# Patient Record
Sex: MALE | Race: WHITE | NOT HISPANIC OR LATINO | Employment: FULL TIME
[De-identification: names, ages, dates, MRNs, and addresses within clinical notes are randomized per-mention and may not be internally consistent; named-entity substitution may affect disease eponyms.]

---

## 2019-09-30 ENCOUNTER — HEALTH MAINTENANCE LETTER (OUTPATIENT)
Age: 59
End: 2019-09-30

## 2021-01-15 ENCOUNTER — HEALTH MAINTENANCE LETTER (OUTPATIENT)
Age: 61
End: 2021-01-15

## 2021-10-24 ENCOUNTER — HEALTH MAINTENANCE LETTER (OUTPATIENT)
Age: 61
End: 2021-10-24

## 2022-02-13 ENCOUNTER — HEALTH MAINTENANCE LETTER (OUTPATIENT)
Age: 62
End: 2022-02-13

## 2022-10-15 ENCOUNTER — HEALTH MAINTENANCE LETTER (OUTPATIENT)
Age: 62
End: 2022-10-15

## 2023-03-26 ENCOUNTER — HEALTH MAINTENANCE LETTER (OUTPATIENT)
Age: 63
End: 2023-03-26

## 2024-02-11 ENCOUNTER — ANESTHESIA (OUTPATIENT)
Dept: SURGERY | Facility: CLINIC | Age: 64
DRG: 481 | End: 2024-02-11
Payer: COMMERCIAL

## 2024-02-11 ENCOUNTER — APPOINTMENT (OUTPATIENT)
Dept: RADIOLOGY | Facility: CLINIC | Age: 64
DRG: 481 | End: 2024-02-11
Attending: EMERGENCY MEDICINE
Payer: COMMERCIAL

## 2024-02-11 ENCOUNTER — APPOINTMENT (OUTPATIENT)
Dept: RADIOLOGY | Facility: CLINIC | Age: 64
DRG: 481 | End: 2024-02-11
Attending: STUDENT IN AN ORGANIZED HEALTH CARE EDUCATION/TRAINING PROGRAM
Payer: COMMERCIAL

## 2024-02-11 ENCOUNTER — PREP FOR PROCEDURE (OUTPATIENT)
Dept: SURGERY | Facility: CLINIC | Age: 64
End: 2024-02-11

## 2024-02-11 ENCOUNTER — APPOINTMENT (OUTPATIENT)
Dept: CT IMAGING | Facility: CLINIC | Age: 64
DRG: 481 | End: 2024-02-11
Attending: PHYSICIAN ASSISTANT
Payer: COMMERCIAL

## 2024-02-11 ENCOUNTER — ANESTHESIA EVENT (OUTPATIENT)
Dept: SURGERY | Facility: CLINIC | Age: 64
DRG: 481 | End: 2024-02-11
Payer: COMMERCIAL

## 2024-02-11 ENCOUNTER — HOSPITAL ENCOUNTER (INPATIENT)
Facility: CLINIC | Age: 64
LOS: 1 days | Discharge: HOME HEALTH | DRG: 481 | End: 2024-02-12
Attending: EMERGENCY MEDICINE | Admitting: STUDENT IN AN ORGANIZED HEALTH CARE EDUCATION/TRAINING PROGRAM
Payer: COMMERCIAL

## 2024-02-11 ENCOUNTER — APPOINTMENT (OUTPATIENT)
Dept: CT IMAGING | Facility: CLINIC | Age: 64
DRG: 481 | End: 2024-02-11
Attending: STUDENT IN AN ORGANIZED HEALTH CARE EDUCATION/TRAINING PROGRAM
Payer: COMMERCIAL

## 2024-02-11 DIAGNOSIS — S72.001A CLOSED FRACTURE OF RIGHT HIP, INITIAL ENCOUNTER (H): ICD-10-CM

## 2024-02-11 DIAGNOSIS — E83.42 HYPOMAGNESEMIA: Primary | ICD-10-CM

## 2024-02-11 DIAGNOSIS — S72.001A CLOSED FRACTURE OF RIGHT HIP, INITIAL ENCOUNTER (H): Primary | ICD-10-CM

## 2024-02-11 LAB
ANION GAP SERPL CALCULATED.3IONS-SCNC: 8 MMOL/L (ref 7–15)
ATRIAL RATE - MUSE: 57 BPM
BASOPHILS # BLD AUTO: ABNORMAL 10*3/UL
BASOPHILS # BLD MANUAL: 0 10E3/UL (ref 0–0.2)
BASOPHILS NFR BLD AUTO: ABNORMAL %
BASOPHILS NFR BLD MANUAL: 0 %
BUN SERPL-MCNC: 16.6 MG/DL (ref 8–23)
CALCIUM SERPL-MCNC: 9.4 MG/DL (ref 8.8–10.2)
CHLORIDE SERPL-SCNC: 104 MMOL/L (ref 98–107)
CREAT SERPL-MCNC: 1 MG/DL (ref 0.67–1.17)
DEPRECATED HCO3 PLAS-SCNC: 25 MMOL/L (ref 22–29)
DIASTOLIC BLOOD PRESSURE - MUSE: 71 MMHG
EGFRCR SERPLBLD CKD-EPI 2021: 84 ML/MIN/1.73M2
EOSINOPHIL # BLD AUTO: ABNORMAL 10*3/UL
EOSINOPHIL # BLD MANUAL: 0.1 10E3/UL (ref 0–0.7)
EOSINOPHIL NFR BLD AUTO: ABNORMAL %
EOSINOPHIL NFR BLD MANUAL: 1 %
ERYTHROCYTE [DISTWIDTH] IN BLOOD BY AUTOMATED COUNT: 13.7 % (ref 10–15)
GLUCOSE BLDC GLUCOMTR-MCNC: 101 MG/DL (ref 70–99)
GLUCOSE SERPL-MCNC: 112 MG/DL (ref 70–99)
HCT VFR BLD AUTO: 43.2 % (ref 40–53)
HGB BLD-MCNC: 14.7 G/DL (ref 13.3–17.7)
IMM GRANULOCYTES # BLD: ABNORMAL 10*3/UL
IMM GRANULOCYTES NFR BLD: ABNORMAL %
INR PPP: 1.04 (ref 0.85–1.15)
INTERPRETATION ECG - MUSE: NORMAL
LYMPHOCYTES # BLD AUTO: ABNORMAL 10*3/UL
LYMPHOCYTES # BLD MANUAL: 1.5 10E3/UL (ref 0.8–5.3)
LYMPHOCYTES NFR BLD AUTO: ABNORMAL %
LYMPHOCYTES NFR BLD MANUAL: 12 %
MCH RBC QN AUTO: 31.5 PG (ref 26.5–33)
MCHC RBC AUTO-ENTMCNC: 34 G/DL (ref 31.5–36.5)
MCV RBC AUTO: 93 FL (ref 78–100)
MONOCYTES # BLD AUTO: ABNORMAL 10*3/UL
MONOCYTES # BLD MANUAL: 0.4 10E3/UL (ref 0–1.3)
MONOCYTES NFR BLD AUTO: ABNORMAL %
MONOCYTES NFR BLD MANUAL: 3 %
NEUTROPHILS # BLD AUTO: ABNORMAL 10*3/UL
NEUTROPHILS # BLD MANUAL: 10.8 10E3/UL (ref 1.6–8.3)
NEUTROPHILS NFR BLD AUTO: ABNORMAL %
NEUTROPHILS NFR BLD MANUAL: 84 %
NRBC # BLD AUTO: 0 10E3/UL
NRBC BLD AUTO-RTO: 0 /100
P AXIS - MUSE: 69 DEGREES
PLAT MORPH BLD: ABNORMAL
PLATELET # BLD AUTO: 160 10E3/UL (ref 150–450)
POTASSIUM SERPL-SCNC: 4.8 MMOL/L (ref 3.4–5.3)
PR INTERVAL - MUSE: 160 MS
QRS DURATION - MUSE: 110 MS
QT - MUSE: 450 MS
QTC - MUSE: 438 MS
R AXIS - MUSE: 67 DEGREES
RBC # BLD AUTO: 4.66 10E6/UL (ref 4.4–5.9)
RBC MORPH BLD: ABNORMAL
SODIUM SERPL-SCNC: 137 MMOL/L (ref 135–145)
SYSTOLIC BLOOD PRESSURE - MUSE: 131 MMHG
T AXIS - MUSE: 56 DEGREES
VENTRICULAR RATE- MUSE: 57 BPM
WBC # BLD AUTO: 12.8 10E3/UL (ref 4–11)

## 2024-02-11 PROCEDURE — C1713 ANCHOR/SCREW BN/BN,TIS/BN: HCPCS | Performed by: STUDENT IN AN ORGANIZED HEALTH CARE EDUCATION/TRAINING PROGRAM

## 2024-02-11 PROCEDURE — 250N000011 HC RX IP 250 OP 636: Performed by: EMERGENCY MEDICINE

## 2024-02-11 PROCEDURE — 370N000017 HC ANESTHESIA TECHNICAL FEE, PER MIN: Performed by: STUDENT IN AN ORGANIZED HEALTH CARE EDUCATION/TRAINING PROGRAM

## 2024-02-11 PROCEDURE — 710N000010 HC RECOVERY PHASE 1, LEVEL 2, PER MIN: Performed by: STUDENT IN AN ORGANIZED HEALTH CARE EDUCATION/TRAINING PROGRAM

## 2024-02-11 PROCEDURE — 85007 BL SMEAR W/DIFF WBC COUNT: CPT | Performed by: EMERGENCY MEDICINE

## 2024-02-11 PROCEDURE — 96376 TX/PRO/DX INJ SAME DRUG ADON: CPT

## 2024-02-11 PROCEDURE — 272N000001 HC OR GENERAL SUPPLY STERILE: Performed by: STUDENT IN AN ORGANIZED HEALTH CARE EDUCATION/TRAINING PROGRAM

## 2024-02-11 PROCEDURE — 120N000001 HC R&B MED SURG/OB

## 2024-02-11 PROCEDURE — 82962 GLUCOSE BLOOD TEST: CPT

## 2024-02-11 PROCEDURE — 70450 CT HEAD/BRAIN W/O DYE: CPT

## 2024-02-11 PROCEDURE — 258N000003 HC RX IP 258 OP 636: Performed by: NURSE ANESTHETIST, CERTIFIED REGISTERED

## 2024-02-11 PROCEDURE — 73552 X-RAY EXAM OF FEMUR 2/>: CPT | Mod: RT

## 2024-02-11 PROCEDURE — 250N000011 HC RX IP 250 OP 636: Performed by: NURSE ANESTHETIST, CERTIFIED REGISTERED

## 2024-02-11 PROCEDURE — 250N000011 HC RX IP 250 OP 636: Performed by: STUDENT IN AN ORGANIZED HEALTH CARE EDUCATION/TRAINING PROGRAM

## 2024-02-11 PROCEDURE — 258N000003 HC RX IP 258 OP 636: Performed by: STUDENT IN AN ORGANIZED HEALTH CARE EDUCATION/TRAINING PROGRAM

## 2024-02-11 PROCEDURE — 250N000013 HC RX MED GY IP 250 OP 250 PS 637: Performed by: STUDENT IN AN ORGANIZED HEALTH CARE EDUCATION/TRAINING PROGRAM

## 2024-02-11 PROCEDURE — 73700 CT LOWER EXTREMITY W/O DYE: CPT | Mod: RT

## 2024-02-11 PROCEDURE — 0QS636Z REPOSITION RIGHT UPPER FEMUR WITH INTRAMEDULLARY INTERNAL FIXATION DEVICE, PERCUTANEOUS APPROACH: ICD-10-PCS | Performed by: STUDENT IN AN ORGANIZED HEALTH CARE EDUCATION/TRAINING PROGRAM

## 2024-02-11 PROCEDURE — 96374 THER/PROPH/DIAG INJ IV PUSH: CPT | Mod: 59

## 2024-02-11 PROCEDURE — 99285 EMERGENCY DEPT VISIT HI MDM: CPT | Mod: 25

## 2024-02-11 PROCEDURE — 250N000011 HC RX IP 250 OP 636: Performed by: ANESTHESIOLOGY

## 2024-02-11 PROCEDURE — 80048 BASIC METABOLIC PNL TOTAL CA: CPT | Performed by: EMERGENCY MEDICINE

## 2024-02-11 PROCEDURE — 250N000009 HC RX 250: Performed by: NURSE ANESTHETIST, CERTIFIED REGISTERED

## 2024-02-11 PROCEDURE — 999N000182 XR SURGERY CARM FLUORO GREATER THAN 5 MIN: Mod: TC

## 2024-02-11 PROCEDURE — 85610 PROTHROMBIN TIME: CPT | Performed by: EMERGENCY MEDICINE

## 2024-02-11 PROCEDURE — 72170 X-RAY EXAM OF PELVIS: CPT

## 2024-02-11 PROCEDURE — 85027 COMPLETE CBC AUTOMATED: CPT | Performed by: EMERGENCY MEDICINE

## 2024-02-11 PROCEDURE — 73560 X-RAY EXAM OF KNEE 1 OR 2: CPT | Mod: RT

## 2024-02-11 PROCEDURE — C1769 GUIDE WIRE: HCPCS | Performed by: STUDENT IN AN ORGANIZED HEALTH CARE EDUCATION/TRAINING PROGRAM

## 2024-02-11 PROCEDURE — 36415 COLL VENOUS BLD VENIPUNCTURE: CPT | Performed by: EMERGENCY MEDICINE

## 2024-02-11 PROCEDURE — 99222 1ST HOSP IP/OBS MODERATE 55: CPT | Performed by: STUDENT IN AN ORGANIZED HEALTH CARE EDUCATION/TRAINING PROGRAM

## 2024-02-11 PROCEDURE — 360N000084 HC SURGERY LEVEL 4 W/ FLUORO, PER MIN: Performed by: STUDENT IN AN ORGANIZED HEALTH CARE EDUCATION/TRAINING PROGRAM

## 2024-02-11 PROCEDURE — 93005 ELECTROCARDIOGRAM TRACING: CPT | Performed by: EMERGENCY MEDICINE

## 2024-02-11 PROCEDURE — 71045 X-RAY EXAM CHEST 1 VIEW: CPT

## 2024-02-11 PROCEDURE — 250N000009 HC RX 250: Performed by: STUDENT IN AN ORGANIZED HEALTH CARE EDUCATION/TRAINING PROGRAM

## 2024-02-11 DEVICE — IMPLANTABLE DEVICE: Type: IMPLANTABLE DEVICE | Site: HIP | Status: FUNCTIONAL

## 2024-02-11 DEVICE — IMP SCR SYN TFNA FENESTRATED LAG 110MM 04.038.210S: Type: IMPLANTABLE DEVICE | Site: HIP | Status: FUNCTIONAL

## 2024-02-11 DEVICE — SCREW BN 38MM 5MM LCK X25 IM NL 04.045.038S: Type: IMPLANTABLE DEVICE | Site: HIP | Status: FUNCTIONAL

## 2024-02-11 RX ORDER — BUPIVACAINE HYDROCHLORIDE 5 MG/ML
INJECTION, SOLUTION EPIDURAL; INTRACAUDAL
Status: COMPLETED | OUTPATIENT
Start: 2024-02-11 | End: 2024-02-11

## 2024-02-11 RX ORDER — OXYCODONE HYDROCHLORIDE 5 MG/1
10 TABLET ORAL EVERY 4 HOURS PRN
Status: DISCONTINUED | OUTPATIENT
Start: 2024-02-11 | End: 2024-02-12 | Stop reason: HOSPADM

## 2024-02-11 RX ORDER — ONDANSETRON 2 MG/ML
INJECTION INTRAMUSCULAR; INTRAVENOUS PRN
Status: DISCONTINUED | OUTPATIENT
Start: 2024-02-11 | End: 2024-02-11

## 2024-02-11 RX ORDER — POLYETHYLENE GLYCOL 3350 17 G/17G
17 POWDER, FOR SOLUTION ORAL DAILY
Status: DISCONTINUED | OUTPATIENT
Start: 2024-02-12 | End: 2024-02-12 | Stop reason: HOSPADM

## 2024-02-11 RX ORDER — VANCOMYCIN HYDROCHLORIDE 1 G/20ML
INJECTION, POWDER, LYOPHILIZED, FOR SOLUTION INTRAVENOUS PRN
Status: DISCONTINUED | OUTPATIENT
Start: 2024-02-11 | End: 2024-02-11 | Stop reason: HOSPADM

## 2024-02-11 RX ORDER — CEFAZOLIN SODIUM 2 G/100ML
2 INJECTION, SOLUTION INTRAVENOUS EVERY 8 HOURS
Qty: 200 ML | Refills: 0 | Status: COMPLETED | OUTPATIENT
Start: 2024-02-11 | End: 2024-02-12

## 2024-02-11 RX ORDER — KETAMINE HYDROCHLORIDE 10 MG/ML
INJECTION INTRAMUSCULAR; INTRAVENOUS PRN
Status: DISCONTINUED | OUTPATIENT
Start: 2024-02-11 | End: 2024-02-11

## 2024-02-11 RX ORDER — CALCIUM CARBONATE 500(1250)
1 TABLET ORAL DAILY
COMMUNITY

## 2024-02-11 RX ORDER — ASPIRIN 81 MG/1
81 TABLET ORAL 2 TIMES DAILY
Status: DISCONTINUED | OUTPATIENT
Start: 2024-02-11 | End: 2024-02-12 | Stop reason: HOSPADM

## 2024-02-11 RX ORDER — NALOXONE HYDROCHLORIDE 0.4 MG/ML
0.4 INJECTION, SOLUTION INTRAMUSCULAR; INTRAVENOUS; SUBCUTANEOUS
Status: DISCONTINUED | OUTPATIENT
Start: 2024-02-11 | End: 2024-02-12 | Stop reason: HOSPADM

## 2024-02-11 RX ORDER — DEXAMETHASONE SODIUM PHOSPHATE 10 MG/ML
INJECTION, SOLUTION INTRAMUSCULAR; INTRAVENOUS PRN
Status: DISCONTINUED | OUTPATIENT
Start: 2024-02-11 | End: 2024-02-11

## 2024-02-11 RX ORDER — PSEUDOEPHED/ACETAMINOPH/DIPHEN 30MG-500MG
500-1000 TABLET ORAL EVERY 4 HOURS PRN
Status: ON HOLD | COMMUNITY
Start: 2023-02-16 | End: 2024-02-12

## 2024-02-11 RX ORDER — LIDOCAINE HYDROCHLORIDE 10 MG/ML
INJECTION, SOLUTION INFILTRATION; PERINEURAL PRN
Status: DISCONTINUED | OUTPATIENT
Start: 2024-02-11 | End: 2024-02-11

## 2024-02-11 RX ORDER — FENTANYL CITRATE 50 UG/ML
50 INJECTION, SOLUTION INTRAMUSCULAR; INTRAVENOUS EVERY 5 MIN PRN
Status: DISCONTINUED | OUTPATIENT
Start: 2024-02-11 | End: 2024-02-11 | Stop reason: HOSPADM

## 2024-02-11 RX ORDER — ONDANSETRON 2 MG/ML
4 INJECTION INTRAMUSCULAR; INTRAVENOUS EVERY 30 MIN PRN
Status: DISCONTINUED | OUTPATIENT
Start: 2024-02-11 | End: 2024-02-11 | Stop reason: HOSPADM

## 2024-02-11 RX ORDER — AMOXICILLIN 250 MG
1 CAPSULE ORAL 2 TIMES DAILY
Status: DISCONTINUED | OUTPATIENT
Start: 2024-02-11 | End: 2024-02-12 | Stop reason: HOSPADM

## 2024-02-11 RX ORDER — ROSUVASTATIN CALCIUM 40 MG/1
40 TABLET, COATED ORAL DAILY
COMMUNITY
Start: 2023-12-26 | End: 2024-12-25

## 2024-02-11 RX ORDER — HYDROMORPHONE HCL IN WATER/PF 6 MG/30 ML
0.4 PATIENT CONTROLLED ANALGESIA SYRINGE INTRAVENOUS EVERY 5 MIN PRN
Status: DISCONTINUED | OUTPATIENT
Start: 2024-02-11 | End: 2024-02-11 | Stop reason: HOSPADM

## 2024-02-11 RX ORDER — BISACODYL 10 MG
10 SUPPOSITORY, RECTAL RECTAL DAILY PRN
Status: DISCONTINUED | OUTPATIENT
Start: 2024-02-11 | End: 2024-02-12 | Stop reason: HOSPADM

## 2024-02-11 RX ORDER — PROCHLORPERAZINE MALEATE 10 MG
10 TABLET ORAL EVERY 6 HOURS PRN
Status: DISCONTINUED | OUTPATIENT
Start: 2024-02-11 | End: 2024-02-12 | Stop reason: HOSPADM

## 2024-02-11 RX ORDER — NALOXONE HYDROCHLORIDE 0.4 MG/ML
0.2 INJECTION, SOLUTION INTRAMUSCULAR; INTRAVENOUS; SUBCUTANEOUS
Status: DISCONTINUED | OUTPATIENT
Start: 2024-02-11 | End: 2024-02-12 | Stop reason: HOSPADM

## 2024-02-11 RX ORDER — LIDOCAINE 40 MG/G
CREAM TOPICAL
Status: DISCONTINUED | OUTPATIENT
Start: 2024-02-11 | End: 2024-02-12

## 2024-02-11 RX ORDER — POLYETHYLENE GLYCOL 3350 17 G/17G
17 POWDER, FOR SOLUTION ORAL 2 TIMES DAILY PRN
Status: DISCONTINUED | OUTPATIENT
Start: 2024-02-11 | End: 2024-02-12 | Stop reason: HOSPADM

## 2024-02-11 RX ORDER — AMOXICILLIN 250 MG
2 CAPSULE ORAL 2 TIMES DAILY PRN
Status: DISCONTINUED | OUTPATIENT
Start: 2024-02-11 | End: 2024-02-12

## 2024-02-11 RX ORDER — BUPIVACAINE HYDROCHLORIDE 2.5 MG/ML
INJECTION, SOLUTION EPIDURAL; INFILTRATION; INTRACAUDAL
Status: COMPLETED | OUTPATIENT
Start: 2024-02-11 | End: 2024-02-11

## 2024-02-11 RX ORDER — HYDROMORPHONE HCL IN WATER/PF 6 MG/30 ML
0.4 PATIENT CONTROLLED ANALGESIA SYRINGE INTRAVENOUS
Status: DISCONTINUED | OUTPATIENT
Start: 2024-02-11 | End: 2024-02-12 | Stop reason: HOSPADM

## 2024-02-11 RX ORDER — VANCOMYCIN HYDROCHLORIDE 1 G/20ML
INJECTION, POWDER, LYOPHILIZED, FOR SOLUTION INTRAVENOUS
Status: DISCONTINUED
Start: 2024-02-11 | End: 2024-02-11 | Stop reason: HOSPADM

## 2024-02-11 RX ORDER — ASPIRIN 81 MG/1
81 TABLET ORAL DAILY
Status: ON HOLD | COMMUNITY
End: 2024-02-12

## 2024-02-11 RX ORDER — CEFAZOLIN SODIUM/WATER 2 G/20 ML
2 SYRINGE (ML) INTRAVENOUS SEE ADMIN INSTRUCTIONS
Status: DISCONTINUED | OUTPATIENT
Start: 2024-02-11 | End: 2024-02-11 | Stop reason: HOSPADM

## 2024-02-11 RX ORDER — ONDANSETRON 4 MG/1
4 TABLET, ORALLY DISINTEGRATING ORAL EVERY 6 HOURS PRN
Status: DISCONTINUED | OUTPATIENT
Start: 2024-02-11 | End: 2024-02-11

## 2024-02-11 RX ORDER — CEFAZOLIN SODIUM/WATER 2 G/20 ML
2 SYRINGE (ML) INTRAVENOUS
Status: COMPLETED | OUTPATIENT
Start: 2024-02-11 | End: 2024-02-11

## 2024-02-11 RX ORDER — ONDANSETRON 2 MG/ML
4 INJECTION INTRAMUSCULAR; INTRAVENOUS EVERY 6 HOURS PRN
Status: DISCONTINUED | OUTPATIENT
Start: 2024-02-11 | End: 2024-02-11

## 2024-02-11 RX ORDER — SODIUM CHLORIDE, SODIUM LACTATE, POTASSIUM CHLORIDE, CALCIUM CHLORIDE 600; 310; 30; 20 MG/100ML; MG/100ML; MG/100ML; MG/100ML
INJECTION, SOLUTION INTRAVENOUS CONTINUOUS
Status: DISCONTINUED | OUTPATIENT
Start: 2024-02-11 | End: 2024-02-11 | Stop reason: HOSPADM

## 2024-02-11 RX ORDER — ACETAMINOPHEN 325 MG/1
975 TABLET ORAL EVERY 8 HOURS
Status: DISCONTINUED | OUTPATIENT
Start: 2024-02-11 | End: 2024-02-12 | Stop reason: HOSPADM

## 2024-02-11 RX ORDER — ROSUVASTATIN CALCIUM 10 MG/1
40 TABLET, COATED ORAL DAILY
Status: DISCONTINUED | OUTPATIENT
Start: 2024-02-11 | End: 2024-02-12 | Stop reason: HOSPADM

## 2024-02-11 RX ORDER — SODIUM CHLORIDE, SODIUM LACTATE, POTASSIUM CHLORIDE, CALCIUM CHLORIDE 600; 310; 30; 20 MG/100ML; MG/100ML; MG/100ML; MG/100ML
INJECTION, SOLUTION INTRAVENOUS CONTINUOUS
Status: DISCONTINUED | OUTPATIENT
Start: 2024-02-11 | End: 2024-02-12 | Stop reason: HOSPADM

## 2024-02-11 RX ORDER — TRANEXAMIC ACID 650 MG/1
1950 TABLET ORAL ONCE
Status: CANCELLED | OUTPATIENT
Start: 2024-02-11 | End: 2024-02-11

## 2024-02-11 RX ORDER — ONDANSETRON 4 MG/1
4 TABLET, ORALLY DISINTEGRATING ORAL EVERY 6 HOURS PRN
Status: DISCONTINUED | OUTPATIENT
Start: 2024-02-11 | End: 2024-02-12 | Stop reason: HOSPADM

## 2024-02-11 RX ORDER — ACETAMINOPHEN 325 MG/1
650 TABLET ORAL EVERY 4 HOURS PRN
Status: DISCONTINUED | OUTPATIENT
Start: 2024-02-14 | End: 2024-02-12 | Stop reason: HOSPADM

## 2024-02-11 RX ORDER — HYDROMORPHONE HYDROCHLORIDE 1 MG/ML
0.5 INJECTION, SOLUTION INTRAMUSCULAR; INTRAVENOUS; SUBCUTANEOUS ONCE
Status: COMPLETED | OUTPATIENT
Start: 2024-02-11 | End: 2024-02-11

## 2024-02-11 RX ORDER — LIDOCAINE 40 MG/G
CREAM TOPICAL
Status: DISCONTINUED | OUTPATIENT
Start: 2024-02-11 | End: 2024-02-12 | Stop reason: HOSPADM

## 2024-02-11 RX ORDER — FENTANYL CITRATE 0.05 MG/ML
INJECTION, SOLUTION INTRAMUSCULAR; INTRAVENOUS PRN
Status: DISCONTINUED | OUTPATIENT
Start: 2024-02-11 | End: 2024-02-11

## 2024-02-11 RX ORDER — MULTIVITAMIN
1 TABLET ORAL DAILY
COMMUNITY

## 2024-02-11 RX ORDER — ONDANSETRON 2 MG/ML
4 INJECTION INTRAMUSCULAR; INTRAVENOUS EVERY 6 HOURS PRN
Status: DISCONTINUED | OUTPATIENT
Start: 2024-02-11 | End: 2024-02-12 | Stop reason: HOSPADM

## 2024-02-11 RX ORDER — MAGNESIUM HYDROXIDE 1200 MG/15ML
LIQUID ORAL PRN
Status: DISCONTINUED | OUTPATIENT
Start: 2024-02-11 | End: 2024-02-11 | Stop reason: HOSPADM

## 2024-02-11 RX ORDER — HYDROMORPHONE HCL IN WATER/PF 6 MG/30 ML
0.2 PATIENT CONTROLLED ANALGESIA SYRINGE INTRAVENOUS EVERY 5 MIN PRN
Status: DISCONTINUED | OUTPATIENT
Start: 2024-02-11 | End: 2024-02-11 | Stop reason: HOSPADM

## 2024-02-11 RX ORDER — OXYCODONE HYDROCHLORIDE 5 MG/1
5 TABLET ORAL EVERY 4 HOURS PRN
Status: DISCONTINUED | OUTPATIENT
Start: 2024-02-11 | End: 2024-02-12 | Stop reason: HOSPADM

## 2024-02-11 RX ORDER — LIDOCAINE 40 MG/G
CREAM TOPICAL
Status: DISCONTINUED | OUTPATIENT
Start: 2024-02-11 | End: 2024-02-11 | Stop reason: HOSPADM

## 2024-02-11 RX ORDER — SODIUM CHLORIDE, SODIUM LACTATE, POTASSIUM CHLORIDE, CALCIUM CHLORIDE 600; 310; 30; 20 MG/100ML; MG/100ML; MG/100ML; MG/100ML
INJECTION, SOLUTION INTRAVENOUS CONTINUOUS PRN
Status: DISCONTINUED | OUTPATIENT
Start: 2024-02-11 | End: 2024-02-11

## 2024-02-11 RX ORDER — MULTIVIT WITH MINERALS/LUTEIN
250 TABLET ORAL DAILY
COMMUNITY

## 2024-02-11 RX ORDER — HYDROMORPHONE HCL IN WATER/PF 6 MG/30 ML
0.2 PATIENT CONTROLLED ANALGESIA SYRINGE INTRAVENOUS
Status: DISCONTINUED | OUTPATIENT
Start: 2024-02-11 | End: 2024-02-12 | Stop reason: HOSPADM

## 2024-02-11 RX ORDER — TRANEXAMIC ACID 650 MG/1
1950 TABLET ORAL ONCE
Qty: 3 TABLET | Refills: 0 | Status: DISCONTINUED | OUTPATIENT
Start: 2024-02-11 | End: 2024-02-11 | Stop reason: HOSPADM

## 2024-02-11 RX ORDER — FENTANYL CITRATE 50 UG/ML
25 INJECTION, SOLUTION INTRAMUSCULAR; INTRAVENOUS EVERY 5 MIN PRN
Status: DISCONTINUED | OUTPATIENT
Start: 2024-02-11 | End: 2024-02-11 | Stop reason: HOSPADM

## 2024-02-11 RX ORDER — AMOXICILLIN 250 MG
1 CAPSULE ORAL 2 TIMES DAILY PRN
Status: DISCONTINUED | OUTPATIENT
Start: 2024-02-11 | End: 2024-02-12

## 2024-02-11 RX ORDER — PROPOFOL 10 MG/ML
INJECTION, EMULSION INTRAVENOUS CONTINUOUS PRN
Status: DISCONTINUED | OUTPATIENT
Start: 2024-02-11 | End: 2024-02-11

## 2024-02-11 RX ORDER — ONDANSETRON 4 MG/1
4 TABLET, ORALLY DISINTEGRATING ORAL EVERY 30 MIN PRN
Status: DISCONTINUED | OUTPATIENT
Start: 2024-02-11 | End: 2024-02-11 | Stop reason: HOSPADM

## 2024-02-11 RX ORDER — CALCIUM CARBONATE 500 MG/1
1000 TABLET, CHEWABLE ORAL 4 TIMES DAILY PRN
Status: DISCONTINUED | OUTPATIENT
Start: 2024-02-11 | End: 2024-02-12 | Stop reason: HOSPADM

## 2024-02-11 RX ORDER — EPHEDRINE SULFATE 50 MG/ML
INJECTION, SOLUTION INTRAMUSCULAR; INTRAVENOUS; SUBCUTANEOUS PRN
Status: DISCONTINUED | OUTPATIENT
Start: 2024-02-11 | End: 2024-02-11

## 2024-02-11 RX ADMIN — TRANEXAMIC ACID 1 G: 1 INJECTION, SOLUTION INTRAVENOUS at 13:49

## 2024-02-11 RX ADMIN — ONDANSETRON 4 MG: 2 INJECTION INTRAMUSCULAR; INTRAVENOUS at 14:46

## 2024-02-11 RX ADMIN — MIDAZOLAM 1 MG: 1 INJECTION INTRAMUSCULAR; INTRAVENOUS at 13:25

## 2024-02-11 RX ADMIN — OXYCODONE HYDROCHLORIDE 5 MG: 5 TABLET ORAL at 18:37

## 2024-02-11 RX ADMIN — HYDROMORPHONE HYDROCHLORIDE 0.5 MG: 1 INJECTION, SOLUTION INTRAMUSCULAR; INTRAVENOUS; SUBCUTANEOUS at 11:10

## 2024-02-11 RX ADMIN — DEXAMETHASONE SODIUM PHOSPHATE 4 MG: 10 INJECTION, SOLUTION INTRAMUSCULAR; INTRAVENOUS at 14:00

## 2024-02-11 RX ADMIN — SENNOSIDES AND DOCUSATE SODIUM 1 TABLET: 8.6; 5 TABLET ORAL at 20:55

## 2024-02-11 RX ADMIN — BUPIVACAINE HYDROCHLORIDE 30 ML: 2.5 INJECTION, SOLUTION EPIDURAL; INFILTRATION; INTRACAUDAL at 13:30

## 2024-02-11 RX ADMIN — TRANEXAMIC ACID 1 G: 1 INJECTION, SOLUTION INTRAVENOUS at 15:00

## 2024-02-11 RX ADMIN — PHENYLEPHRINE HYDROCHLORIDE 0.3 MCG/KG/MIN: 10 INJECTION INTRAVENOUS at 14:02

## 2024-02-11 RX ADMIN — FENTANYL CITRATE 50 MCG: 0.05 INJECTION, SOLUTION INTRAMUSCULAR; INTRAVENOUS at 13:30

## 2024-02-11 RX ADMIN — PHENYLEPHRINE HYDROCHLORIDE 100 MCG: 10 INJECTION INTRAVENOUS at 14:12

## 2024-02-11 RX ADMIN — ACETAMINOPHEN 975 MG: 325 TABLET ORAL at 23:56

## 2024-02-11 RX ADMIN — PHENYLEPHRINE HYDROCHLORIDE 100 MCG: 10 INJECTION INTRAVENOUS at 14:29

## 2024-02-11 RX ADMIN — OXYCODONE HYDROCHLORIDE 5 MG: 5 TABLET ORAL at 23:56

## 2024-02-11 RX ADMIN — PHENYLEPHRINE HYDROCHLORIDE 100 MCG: 10 INJECTION INTRAVENOUS at 14:23

## 2024-02-11 RX ADMIN — PROPOFOL 100 MCG/KG/MIN: 10 INJECTION, EMULSION INTRAVENOUS at 13:44

## 2024-02-11 RX ADMIN — Medication 5 MG: at 14:43

## 2024-02-11 RX ADMIN — HYDROMORPHONE HYDROCHLORIDE 1 MG: 1 INJECTION, SOLUTION INTRAMUSCULAR; INTRAVENOUS; SUBCUTANEOUS at 09:27

## 2024-02-11 RX ADMIN — Medication 5 MG: at 14:29

## 2024-02-11 RX ADMIN — PHENYLEPHRINE HYDROCHLORIDE 0.2 MCG/KG/MIN: 10 INJECTION INTRAVENOUS at 13:48

## 2024-02-11 RX ADMIN — ASPIRIN 81 MG: 81 TABLET, COATED ORAL at 20:55

## 2024-02-11 RX ADMIN — Medication 2 G: at 13:49

## 2024-02-11 RX ADMIN — Medication 5 MG: at 14:08

## 2024-02-11 RX ADMIN — SODIUM CHLORIDE, POTASSIUM CHLORIDE, SODIUM LACTATE AND CALCIUM CHLORIDE: 600; 310; 30; 20 INJECTION, SOLUTION INTRAVENOUS at 13:24

## 2024-02-11 RX ADMIN — BUPIVACAINE HYDROCHLORIDE 3 ML: 5 INJECTION, SOLUTION EPIDURAL; INTRACAUDAL; PERINEURAL at 13:33

## 2024-02-11 RX ADMIN — PHENYLEPHRINE HYDROCHLORIDE 100 MCG: 10 INJECTION INTRAVENOUS at 14:54

## 2024-02-11 RX ADMIN — SODIUM CHLORIDE, POTASSIUM CHLORIDE, SODIUM LACTATE AND CALCIUM CHLORIDE: 600; 310; 30; 20 INJECTION, SOLUTION INTRAVENOUS at 14:35

## 2024-02-11 RX ADMIN — Medication 5 MG: at 14:23

## 2024-02-11 RX ADMIN — ROSUVASTATIN CALCIUM 40 MG: 10 TABLET, FILM COATED ORAL at 17:24

## 2024-02-11 RX ADMIN — HYDROMORPHONE HYDROCHLORIDE 0.2 MG: 0.2 INJECTION, SOLUTION INTRAMUSCULAR; INTRAVENOUS; SUBCUTANEOUS at 20:53

## 2024-02-11 RX ADMIN — Medication 5 MG: at 14:54

## 2024-02-11 RX ADMIN — LIDOCAINE HYDROCHLORIDE 3 ML: 10 INJECTION, SOLUTION INFILTRATION; PERINEURAL at 13:44

## 2024-02-11 RX ADMIN — KETAMINE HYDROCHLORIDE 30 MG: 10 INJECTION INTRAMUSCULAR; INTRAVENOUS at 13:40

## 2024-02-11 RX ADMIN — CEFAZOLIN SODIUM 2 G: 2 INJECTION, SOLUTION INTRAVENOUS at 21:21

## 2024-02-11 RX ADMIN — SODIUM CHLORIDE, POTASSIUM CHLORIDE, SODIUM LACTATE AND CALCIUM CHLORIDE: 600; 310; 30; 20 INJECTION, SOLUTION INTRAVENOUS at 17:26

## 2024-02-11 RX ADMIN — PHENYLEPHRINE HYDROCHLORIDE 100 MCG: 10 INJECTION INTRAVENOUS at 14:43

## 2024-02-11 ASSESSMENT — LIFESTYLE VARIABLES: TOBACCO_USE: 1

## 2024-02-11 ASSESSMENT — ACTIVITIES OF DAILY LIVING (ADL)
TOILETING_ISSUES: NO
ADLS_ACUITY_SCORE: 30
ADLS_ACUITY_SCORE: 36
ADLS_ACUITY_SCORE: 30
ADLS_ACUITY_SCORE: 33
ADLS_ACUITY_SCORE: 32
ADLS_ACUITY_SCORE: 35
ADLS_ACUITY_SCORE: 36
DEPENDENT_IADLS:: SHOPPING;TRANSPORTATION
ADLS_ACUITY_SCORE: 35

## 2024-02-11 ASSESSMENT — ENCOUNTER SYMPTOMS
BACK PAIN: 0
ARTHRALGIAS: 1
SHORTNESS OF BREATH: 0
MYALGIAS: 0
ABDOMINAL PAIN: 0

## 2024-02-11 NOTE — ED NOTES
Pt returned from xray. Wife, Grazyna, in room. Rates pain to right hip 6/10. Declined ice pack at this time. Will continue to monitor.

## 2024-02-11 NOTE — OP NOTE
Preoperative diagnosis:  1.  Right intertrochanteric femur fracture    Postoperative diagnosis:  1.  Right intertrochanteric femur fracture    Procedure(s) performed:  Right hip intertrochanteric femoral nailing    Attending Surgeon: Raffi Summers MD  Assistant: Laura Hughes. CURTIS (A skilled assistant was necessary for this procedure to help with patient positioning, manipulation and prep the surgical extremity, instrumentation, and safe/timely progress of the procedure.)    Anesthesia: Spinal  IV fluids: See anesthesia record  Estimated blood loss: 100 mL    Specimens: None  Drains: None    Complications: None apparent  Disposition: Stable to PACU    Indications:  This patient is a 63-year-old male who fell down the stairs onto concrete and sustained a displaced right intertrochanteric femur fracture.  Recommendation was made for surgery for return to weightbearing, pain control, and appropriate healing.  Risks were reviewed in great detail, including bleeding, infection, nerve damage, DVT or PE, anesthesia complications, risk of nonunion, risk of malunion, need for revision surgery, incomplete healing, and others.. Risks, benefits, and alternatives were reviewed.  Patient indicated desire to proceed informed consent was obtained.    Operative findings:  Displaced trochanteric femoral fracture    Synthes intermediate length 11 mm diameter nail with a interlocking distal screw and 110 mm long sliding screw    Procedure in detail:  The patient was identified in preoperative holding where the right hip was marked and surgical site was confirmed.  The patient was then brought to the operating room and spinal anesthetic was induced.  The patient was positioned in the supine position all bony neural prominences were padded.  Close reduction was performed utilizing the fracture table placing traction, internal rotation, and adduction on the leg.  The surgical hip was then prepped and draped in the typical sterile  fashion.  A presurgical timeout was completed.  Antibiotics were administered by anesthesia just prior to incision.    An approximately 3 cm incision was made over the gluteus.  Fascia was split in line with the incision.  Starting wire was then advanced down to the tip of the greater trochanter.  This was centered on AP and lateral x-ray.  It was then advanced into bone.  Coring opening reamer was then advanced over the guidewire.  A ball-tipped guidewire was then advanced through the starting hole and down to the knee.  Sequential reaming was performed up to a 12.5 mm diameter which had excellent chatter.  An intermediate length nail was then selected, 11 mm in diameter.  The nail was then advanced by hand over the ball-tipped guidewire and gently impacted and seated the appropriate level.  Triple sleeve was then advanced through a lateral incision.  Guidewire was then advanced into the center of the femoral head and seated appropriate on AP and lateral x-ray.  This was then overreamed and tapped given good bone quality.  110 mm sliding screw was then advanced by hand over the wire and seated appropriately.  Setscrew was then tightened fully and then backed off one half turn to allow for dynamic compression.  The compression nut  was then utilized to better compress across the fracture site.  I then drilled and placed the interlocking screw through the aiming arm guide.  This was bicortical and had excellent purchase.  Aiming arm was then removed.  Final x-rays were obtained and demonstrated appropriate hardware position and excellent fracture reduction.  Wounds were copiously irrigated normal saline.  1 g vancomycin powder was sprinkled deep within the wounds.  Layered closure was performed using 0 Vicryl, 2-0 Vicryl, and staples for skin.    The drapes were taken down and anesthesia was reversed and the patient was taken recovery in stable condition.  No complications were noted.  All sponge and needle counts  were correct.    Postoperative plan:  WBAT  24 hrs post op ancef  DVT ppx: [id number] BID for 4 weeks, SCDs, early mobilization  PT/OT  Dispo: pending PT/OT     MEERA TERESA MD

## 2024-02-11 NOTE — ANESTHESIA POSTPROCEDURE EVALUATION
Patient: To Hearn    Procedure: Procedure(s):  INTERNAL FIXATION, FRACTURE, TROCHANTERIC, HIP, USING PINS OR RODS       Anesthesia Type:  Spinal    Note:     Postop Pain Control: Uneventful            Sign Out: Well controlled pain   PONV: No   Neuro/Psych: Uneventful            Sign Out: Acceptable/Baseline neuro status   Airway/Respiratory: Uneventful            Sign Out: Acceptable/Baseline resp. status   CV/Hemodynamics: Uneventful            Sign Out: Acceptable CV status; No obvious hypovolemia; No obvious fluid overload   Other NRE: NONE   DID A NON-ROUTINE EVENT OCCUR? No           Last vitals:  Vitals Value Taken Time   /55 02/11/24 1540   Temp 36.1  C (97  F) 02/11/24 1540   Pulse 73 02/11/24 1542   Resp 21 02/11/24 1541   SpO2 94 % 02/11/24 1542   Vitals shown include unfiled device data.    Electronically Signed By: Mirza Whitehead MD  February 11, 2024  5:31 PM

## 2024-02-11 NOTE — PHARMACY-ADMISSION MEDICATION HISTORY
Pharmacy Intern Admission Medication History    Admission medication history is complete. The information provided in this note is only as accurate as the sources available at the time of the update.    Information Source(s): Patient, Family member, and CareEverywhere/SureScripts via in-person wife Grazyna    Pertinent Information: eye injection about a week ago     Changes made to PTA medication list:  Added: aspirin, multivitamin, calcium, vit. C, magnesium   Deleted: None  Changed: None    Allergies reviewed with patient and updates made in EHR: yes    Medication History Completed By: Keiko Espino 2/11/2024 11:01 AM    PTA Med List   Medication Sig Note Last Dose    acetaminophen (TYLENOL) 500 MG tablet Take 500-1,000 mg by mouth every 4 hours as needed for mild pain  2/10/2024    aspirin 81 MG EC tablet Take 81 mg by mouth daily  2/10/2024    calcium carbonate (OS-FAROOQ) 500 MG tablet Take 1 tablet by mouth daily  2/10/2024    Magnesium Oxide 250 MG TABS Take 250 tablets by mouth daily  2/10/2024    multivitamin w/minerals (MULTI-VITAMIN) tablet Take 1 tablet by mouth daily  2/10/2024    ranibizumab (LUCENTIS) 0.5 MG/0.05ML SOLN 0.5 mg by Intravitreal route every 28 days 2/11/2024: Gets in-clinic injection monthly as needed for macular degeneration Past Week    rosuvastatin (CRESTOR) 40 MG tablet Take 40 mg by mouth daily  2/10/2024    vitamin C (ASCORBIC ACID) 250 MG tablet Take 250 mg by mouth daily  2/10/2024

## 2024-02-11 NOTE — CONSULTS
Care Management Initial Consult    General Information  Assessment completed with: Patient, Spouse or significant other, Patient and wife Grazyna at bedside  Type of CM/SW Visit: Initial Assessment    Primary Care Provider verified and updated as needed: Yes   Readmission within the last 30 days: no previous admission in last 30 days      Reason for Consult: discharge planning  Advance Care Planning: Advance Care Planning Reviewed: no concerns identified, other (see comments) (Has forms at home)          Communication Assessment  Patient's communication style: spoken language (English or Bilingual)    Hearing Difficulty or Deaf: no   Wear Glasses or Blind: yes    Cognitive  Cognitive/Neuro/Behavioral: WDL                      Living Environment:   People in home: spouse     Current living Arrangements: house ([facility])      Able to return to prior arrangements: yes  Living Arrangement Comments: Lives with wife Grazyna    Family/Social Support:  Care provided by: self  Provides care for: no one  Marital Status:   Wife  Grazyna       Description of Support System: Supportive, Involved    Support Assessment: Adequate family and caregiver support    Current Resources:   Patient receiving home care services: No     Community Resources: None  Equipment currently used at home: none  Supplies currently used at home: None    Employment/Financial:  Employment Status: employed full-time        Financial Concerns: none         Does the patient's insurance plan have a 3 day qualifying hospital stay waiver?  No  Has [employer] insurance but not yet showing in Epic. So can't verify if they are participant.     Lifestyle & Psychosocial Needs:  Social Determinants of Health     Food Insecurity: Not on file   Depression: Not on file   Housing Stability: Not on file   Tobacco Use: High Risk (4/10/2022)    Patient History     Smoking Tobacco Use: Every Day     Smokeless Tobacco Use: Never     Passive Exposure: Not on file   Financial Resource  Strain: Not on file   Alcohol Use: Not on file   Transportation Needs: Not on file   Physical Activity: Not on file   Interpersonal Safety: Not on file   Stress: Not on file   Social Connections: Not on file       Functional Status:  Prior to admission patient needed assistance:   Dependent ADLs:: Independent  Dependent IADLs:: Shopping, Transportation  Assesssment of Functional Status: Not at  functional baseline    Mental Health Status:          Chemical Dependency Status:              Values/Beliefs:  Spiritual, Cultural Beliefs, Restoration Practices, Values that affect care:                 Additional Information:   63-year-old male with a pertinent history of right knee arthroplasty and tobacco abuse who presents to this ED via ambulance for evaluation of fall. The patient was walking downstairs when he slipped on the second to last step and fell on his right knee and hip. He takes a baby Aspirin and cholesterol medication. Denies head trauma, shortness of breath, back pain, abdominal pain, arm pain, or any other complaints at this time .    Reports he lives in a [facility] with wife Grazyna. Patient states independence with I/ADLs at baseline. No assistive ambulatory device; no home care services; drives only to and from work due to vision. Declined [employer] because they have form at home. Discussed discharge planning with patient and wife. Answered questions regarding home care services; outpatient services, and TCU services. Patient hopes to go home on discharge and follow-up as outpatient. Explained that patient will be seen by therapies and they will also give recommendations that they can discuss and make decision regarding home services. Patient and family understand that it all depends on how patient does after surgery. No Consult orders written at this time.    OMID SAXENA RN/CM

## 2024-02-11 NOTE — ED NOTES
Pt resting. O2Sat dropping mid to high 80's on room air. Pt verbalizes questionable hx of sleep apnea but states he has not been tested. 3L of O2 applied via NC to maintain O2sat >90%. Will continue to monitor.

## 2024-02-11 NOTE — ANESTHESIA CARE TRANSFER NOTE
Patient: To Hearn    Procedure: Procedure(s):  INTERNAL FIXATION, FRACTURE, TROCHANTERIC, HIP, USING PINS OR RODS       Diagnosis: Closed fracture of right hip, initial encounter (H) [S72.001A]  Diagnosis Additional Information: No value filed.    Anesthesia Type:   Spinal     Note:    Oropharynx: oropharynx clear of all foreign objects and spontaneously breathing  Level of Consciousness: awake  Oxygen Supplementation: face mask    Independent Airway: airway patency satisfactory and stable  Dentition: dentition unchanged  Vital Signs Stable: post-procedure vital signs reviewed and stable  Report to RN Given: handoff report given  Patient transferred to: PACU    Handoff Report: Identifed the Patient, Identified the Reponsible Provider, Reviewed the pertinent medical history, Discussed the surgical course, Reviewed Intra-OP anesthesia mangement and issues during anesthesia, Set expectations for post-procedure period and Allowed opportunity for questions and acknowledgement of understanding      Vitals:  Vitals Value Taken Time   /57 02/11/24 1513   Temp 98    Pulse 67 02/11/24 1513   Resp 14 02/11/24 1513   SpO2 100 % 02/11/24 1513   Vitals shown include unfiled device data.    Electronically Signed By: JIMENA La CRNA  February 11, 2024  3:14 PM

## 2024-02-11 NOTE — ANESTHESIA PROCEDURE NOTES
PENG Procedure Note    Pre-Procedure   Staff -        Anesthesiologist:  Mirza Whitehead MD       Performed By: anesthesiologist       Location: pre-op       Procedure Start/Stop Times: 2/11/2024 1:27 PM and 2/11/2024 1:30 PM       Pre-Anesthestic Checklist: patient identified, IV checked, site marked, risks and benefits discussed, informed consent, monitors and equipment checked, pre-op evaluation, at physician/surgeon's request and post-op pain management  Timeout:       Correct Patient: Yes        Correct Procedure: Yes        Correct Site: Yes        Correct Position: Yes        Correct Laterality: Yes        Site Marked: Yes  Procedure Documentation  Procedure: PENG       Laterality: right       Patient Position: supine       Patient Prep/Sterile Barriers: sterile gloves, mask       Skin prep: Chloraprep       Needle Type: short bevel       Needle Gauge: 20.        Needle Length (Inches): 4        Ultrasound guided       1. Ultrasound was used to identify targeted nerve, plexus, vascular marker, or fascial plane and place a needle adjacent to it in real-time.       2. Ultrasound was used to visualize the spread of anesthetic in close proximity to the above referenced structure.       3. A permanent image is entered into the patient's record.       4. The visualized anatomic structures appeared normal.       5. There were no apparent abnormal pathologic findings.    Assessment/Narrative         The placement was negative for: blood aspirated, painful injection and site bleeding       Paresthesias: No.       Bolus given via needle..        Secured via.        Insertion/Infusion Method: Single Shot       Complications: none       Injection made incrementally with aspirations every 5 mL.    Medication(s) Administered   Bupivacaine 0.25% PF (Infiltration) - Infiltration   30 mL - 2/11/2024 1:30:00 PM  Medication Administration Time: 2/11/2024 1:27 PM      FOR [facility] ([facility]) ONLY:   Pain Team Contact  "information: please page the Pain Team Via [facility]. Search \"Pain\". During daytime hours, please page the attending first. At night please page the resident first.      "

## 2024-02-11 NOTE — CONSULTS
ORTHOPEDIC CONSULTATION    CHIEF COMPLAINT: Right hip fracture      HISTORY OF PRESENT ILLNESS:  The patient is seen in orthopedic consultation at the request of Stanley Falcon MD.  The patient is a 63 year old male with c/o right hip pain. He was walking down the stairs and slipped on the last step falling on the cement floor on his right side. He complains of right hip and knee pain. Xrays in the ED revealed a right intertrochanteric hip fracture. He is not on any blood thinners. He lives independently with his wife. He does not use any walking aids at baseline. He is currently NPO with his last thing to eat being last night.     PAST MEDICAL HISTORY:     Past Medical History:   Diagnosis Date    NO ACTIVE PROBLEMS        ALLERGIES:   Phenylephrine-doxylamine-dm    MEDICATIONS ON ADMISSION:  Medications were reviewed.  They do include:   (Not in a hospital admission)      SOCIAL HISTORY:    reports that he has been smoking cigarettes. He has been smoking an average of 0.5 packs per day. He has never used smokeless tobacco. He reports current alcohol use. He reports that he does not use drugs.     FAMILY HISTORY:  Family History   Problem Relation Age of Onset    Unknown/Adopted Other        REVIEW OF SYSTEMS:   See Admission History and Physical     PHYSICAL EXAMINATION:  General: On examination, the patient is A&Ox3  SKIN/HAIR/NAILS: normal   Pulses: Dorsalis pedis pulses intact  Sensation: intact bilateral lower extremities  Tenderness: right hip and groin. Right leg shortened and externally rotated  ROM: Pain with log roll right leg  Heart: RRR no murmurs  Lungs: normal effort. Lungs CTA bilaterally     Contralateral side= Full range of motion, Negative instability findings, 5/5 hip flexor and abductor strength, Non-tender.     Temp:  [97.7  F (36.5  C)] 97.7  F (36.5  C)  Pulse:  [54-71] 68  Resp:  [18-20] 18  BP: (122-138)/(68-79) 129/70  SpO2:  [90 %-95 %] 95 %    RADIOGRAPHIC EVALUATION:  AP pelvis and  lateral of the right hip reviewed. Mildly displaced right hip intertrochanteric hip fracutre       LABORATORY DATA:     Lab Results   Component Value Date    INR 1.04 02/11/2024       IMPRESSION:  Right intertrochanteric hip fracture     PLAN:  I reviewed To's clinical presentation, imaging, and diagnosis with him today. We discussed treatment options for his hip fracture including conservative and surgical options. Surgery is recommended and will plan for right hip transfemoral nailing. Risks and benefits of surgery were discussed including but not limited to infection, hardware failure, anesthesia complications, and blood clots. Patient is NPO. We will plan for surgery today. Will be WBAT post-op and need ASA post-op. Patient likely can return home after surgery but will depend on his progress with PT and pain management. All questions were answered and he would like to proceed with surgery today.      Laura Hughes PA-C, Dr. Raffi Teresa MD    ADDENDUM:    I saw and evaluate the patient.  He has a right intertrochanteric femur fracture confirmed on x-ray and CT scan.  He is grossly neurovascular intact.  I reviewed the risks and benefits of surgery.  Reviewed the risks of bleeding, infection, nonunion, malunion, need for additional surgery including revision surgery, and others.  No grades are made regarding specific outcome.  All questions were answered and informed consent was obtained.    Planned operation: Right hip trochanteric nailing    RAFFI TERESA MD

## 2024-02-11 NOTE — ED PROVIDER NOTES
EMERGENCY DEPARTMENT ENCOUNTER      NAME: To Hearn  AGE: 63 year old male  YOB: 1960  MRN: [id number]  EVALUATION DATE & TIME: 2024  9:02 AM    PCP: No primary care provider on file.    ED PROVIDER: Stanley Lin MD      Chief Complaint   Patient presents with    Fall    Hip Pain     right         FINAL IMPRESSION:  1. Closed fracture of right hip, initial encounter (H)          ED COURSE & MEDICAL DECISION MAKIN:20 AM I met with the patient, obtained history, performed physical exam, and discussed ED plan.  10:42 AM I rechecked on the patient and updated them on lab results and the plan. No proximal tibial tendereness on exam.   11:17 AM I spoke to Dr. Cannon with ortho.  11:56 AM I spoke to hospitalist, Antoni Irvin MD.    Pertinent Labs & Imaging studies reviewed. (See chart for details)  63 year old male presents to the Emergency Department for evaluation of fall.     Patient reports he missed the last 2 steps going downstairs and fell injuring his hip.  Was not on the ground very long.  Received 100 mics of fentanyl by EMS.  He is intact to light touch sensation to distal right foot is a strong peripheral pulse.  Leg is shortened and externally rotated he has pain to palpation to proximal femur and hip concerning for fracture    Denies any other injuries.  Denies chest pain, headache, neck pain, back pain.  He is on a baby aspirin but denies any anticoagulation.  Last ate or drank anything at 8 AM which was coffee.    IV access obtained and given IV Dilaudid.  Will obtain x-ray hip and pelvis and femur and right knee    I reviewed x-ray which shows a displaced femoral neck fracture.    On x-ray there was irregularity to proximal tibia concerning for possible occult injury or remote injury.  On repeat examination has has no proximal tibial tenderness.  Compartments soft.  Has had previous surgery to this knee    Orthopedics paged.  Preop labs and EKG  ordered    Spoke with orthopedics recommends keep n.p.o.    I spoke with admitting hospitalist who agreed to admit patient.    ED Course as of 02/11/24 1158   Sun Feb 11, 2024   1136 Hemoglobin: 14.7   1136 Platelet Count: 160   1136 Creatinine: 1.00       Medical Decision Making  Obtained supplemental history:Supplemental history obtained?: Documented in chart  Reviewed external records: I reviewed cardiology note from [facility] in [facility] on November 7 where was noted patient has coronary artery disease and is status post stenting with no further angina.  Patient does continue to smoke however  Care impacted by chronic illness:Smoking / Nicotine Use  Care significantly affected by social determinants of health:N/A  Did you consider but not order tests?: Work up considered but not performed and documented in chart, if applicable  Did you interpret images independently?: Independent interpretation of ECG and images noted in documentation, when applicable.  Consultation discussion with other provider: I spoke with orthopedics.  Spoke with the admitting hospital ist    At the conclusion of the encounter I discussed the results of all of the tests and the disposition. The questions were answered. The patient or family acknowledged understanding and was agreeable with the care plan.         MEDICATIONS GIVEN IN THE EMERGENCY:  Medications   HYDROmorphone (DILAUDID) injection 1 mg (1 mg Intravenous $Given 2/11/24 0905)   HYDROmorphone (PF) (DILAUDID) injection 0.5 mg (0.5 mg Intravenous $Given 2/11/24 1110)       NEW PRESCRIPTIONS STARTED AT TODAY'S ER VISIT  New Prescriptions    No medications on file          =================================================================    HPI    Patient information was obtained from: patient    Use of : N/A        To Hearn is a 63 year old male with a pertinent history of right knee arthroplasty and tobacco abuse who presents to this ED via  ambulance for evaluation of fall. The patient was walking downstairs when he slipped on the second to last step and fell on his right knee and hip. He takes a baby Aspirin and cholesterol medication. Denies head trauma, shortness of breath, back pain, abdominal pain, arm pain, or any other complaints at this time.    Per chart review,  1/25/24 The patient visited ophthalmology for left CNVM. You may experience some mild irritation, a burning sensation, tearing, and/or a scratchy feeling in the eye following an injection. If this occurs, you may use the eye wash provided and/or artificial tears to soothe the eye.     REVIEW OF SYSTEMS   Review of Systems   Respiratory:  Negative for shortness of breath.    Gastrointestinal:  Negative for abdominal pain.   Musculoskeletal:  Positive for arthralgias (Hip, knee). Negative for back pain and myalgias.        PAST MEDICAL HISTORY:  Past Medical History:   Diagnosis Date    NO ACTIVE PROBLEMS        PAST SURGICAL HISTORY:  Past Surgical History:   Procedure Laterality Date    ARTHROSCOPY KNEE  11/10/2011    Procedure:ARTHROSCOPY KNEE; Right knee arthroscopy, meniscal debridement, choice anesthesia; Surgeon:MELVINA ALFORD; Location:[facility] OR    COLONOSCOPY  10/2011    COLONOSCOPY  10/3/2011    Procedure:COMBINED COLONOSCOPY, SINGLE BIOPSY/POLYPECTOMY BY BIOPSY; Colonoscopy, screening; Surgeon:JILLIAN FROST; Location:[facility] OR    HC KNEE SCOPE,MED/LAT MENISECTOMY  11/10/2011    Rt knee scope, partial MM    TONSILLECTOMY  1966 or 1967           CURRENT MEDICATIONS:    acetaminophen (TYLENOL) 500 MG tablet  aspirin 81 MG EC tablet  calcium carbonate (OS-FAROOQ) 500 MG tablet  Magnesium Oxide 250 MG TABS  multivitamin w/minerals (MULTI-VITAMIN) tablet  ranibizumab (LUCENTIS) 0.5 MG/0.05ML SOLN  rosuvastatin (CRESTOR) 40 MG tablet  vitamin C (ASCORBIC ACID) 250 MG tablet        ALLERGIES:  Allergies   Allergen Reactions    Phenylephrine-Doxylamine-Dm Palpitations       FAMILY  "HISTORY:  Family History   Problem Relation Age of Onset    Unknown/Adopted Other        SOCIAL HISTORY:   Social History     Socioeconomic History    Marital status:    Tobacco Use    Smoking status: Every Day     Packs/day: .5     Types: Cigarettes    Smokeless tobacco: Never   Substance and Sexual Activity    Alcohol use: Yes     Comment: socially    Drug use: No    Sexual activity: Yes     Partners: Female   Other Topics Concern     Service Yes    Blood Transfusions No    Caffeine Concern No    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  Eyes: Conjunctivae non-injected. Sclera anicteric.  HENT: Atraumatic.  Neck: Supple.  Respiratory/Chest: Respiration unlabored.  Abdomen: non distended  Musculoskeletal: Proximal right femur and right hip tenderness . palpable dorsalis pedis pulse. Proximal thigh tenderness. Right leg shortened and externally rotated.  Light touch sensation intact to distal extremity and 2+ dorsal pedal pulse  Skin: Normal color. No rash or diaphoresis.  Neurologic: Face symmetric, moves all extremities spontaneously. Speech clear.  Psychiatric: Oriented to person, place, and time. Affect appropriate.    LAB:  All pertinent labs reviewed and interpreted.  Results for orders placed or performed during the hospital encounter of 02/11/24   XR Femur Right 2 Views    Impression    IMPRESSION: Acute " comminuted mildly impacted intertrochanteric right femoral neck fracture with medial displacement of the lesser trochanter fracture fragment.    Mild degenerative arthrosis both hips. Degenerative changes both SI joints. Lower lumbar facet arthropathy.    Small corticated curvilinear ossific fragment posterior to the proximal tibial metaphysis which may be artifactual related to positioning, or sequela of remote trauma. Mild narrowing of the medial compartment of the right knee. No right knee joint   effusion.     NOTE: ABNORMAL REPORT    THE DICTATION ABOVE DESCRIBES AN ABNORMALITY FOR WHICH FOLLOW-UP IS NEEDED.   XR Knee Right 1/2 Views    Impression    IMPRESSION: Acute comminuted mildly impacted intertrochanteric right femoral neck fracture with medial displacement of the lesser trochanter fracture fragment.    Mild degenerative arthrosis both hips. Degenerative changes both SI joints. Lower lumbar facet arthropathy.    Small corticated curvilinear ossific fragment posterior to the proximal tibial metaphysis which may be artifactual related to positioning, or sequela of remote trauma. Mild narrowing of the medial compartment of the right knee. No right knee joint   effusion.     NOTE: ABNORMAL REPORT    THE DICTATION ABOVE DESCRIBES AN ABNORMALITY FOR WHICH FOLLOW-UP IS NEEDED.   XR Pelvis 1/2 Views    Impression    IMPRESSION: Acute comminuted mildly impacted intertrochanteric right femoral neck fracture with medial displacement of the lesser trochanter fracture fragment.    Mild degenerative arthrosis both hips. Degenerative changes both SI joints. Lower lumbar facet arthropathy.    Small corticated curvilinear ossific fragment posterior to the proximal tibial metaphysis which may be artifactual related to positioning, or sequela of remote trauma. Mild narrowing of the medial compartment of the right knee. No right knee joint   effusion.     NOTE: ABNORMAL REPORT    THE DICTATION ABOVE DESCRIBES AN ABNORMALITY  FOR WHICH FOLLOW-UP IS NEEDED.   XR Chest Port 1 View    Impression    IMPRESSION:    Lung volumes are low, with associated hypoventilatory changes. No airspace opacities, pleural effusions, or pneumothorax. Normal pulmonary vascularity. Nonenlarged cardiac silhouette. Aortic atherosclerotic calcifications.   Basic metabolic panel   Result Value Ref Range    Sodium 137 135 - 145 mmol/L    Potassium 4.8 3.4 - 5.3 mmol/L    Chloride 104 98 - 107 mmol/L    Carbon Dioxide (CO2) 25 22 - 29 mmol/L    Anion Gap 8 7 - 15 mmol/L    Urea Nitrogen 16.6 8.0 - 23.0 mg/dL    Creatinine 1.00 0.67 - 1.17 mg/dL    GFR Estimate 84 >60 mL/min/1.73m2    Calcium 9.4 8.8 - 10.2 mg/dL    Glucose 112 (H) 70 - 99 mg/dL   Result Value Ref Range    INR 1.04 0.85 - 1.15   CBC with platelets and differential   Result Value Ref Range    WBC Count 12.8 (H) 4.0 - 11.0 10e3/uL    RBC Count 4.66 4.40 - 5.90 10e6/uL    Hemoglobin 14.7 13.3 - 17.7 g/dL    Hematocrit 43.2 40.0 - 53.0 %    MCV 93 78 - 100 fL    MCH 31.5 26.5 - 33.0 pg    MCHC 34.0 31.5 - 36.5 g/dL    RDW 13.7 10.0 - 15.0 %    Platelet Count 160 150 - 450 10e3/uL    % Neutrophils      % Lymphocytes      % Monocytes      % Eosinophils      % Basophils      % Immature Granulocytes      NRBCs per 100 WBC 0 <1 /100    Absolute Neutrophils      Absolute Lymphocytes      Absolute Monocytes      Absolute Eosinophils      Absolute Basophils      Absolute Immature Granulocytes      Absolute NRBCs 0.0 10e3/uL   Manual Differential   Result Value Ref Range    % Neutrophils 84 %    % Lymphocytes 12 %    % Monocytes 3 %    % Eosinophils 1 %    % Basophils 0 %    Absolute Neutrophils 10.8 (H) 1.6 - 8.3 10e3/uL    Absolute Lymphocytes 1.5 0.8 - 5.3 10e3/uL    Absolute Monocytes 0.4 0.0 - 1.3 10e3/uL    Absolute Eosinophils 0.1 0.0 - 0.7 10e3/uL    Absolute Basophils 0.0 0.0 - 0.2 10e3/uL    RBC Morphology Confirmed RBC Indices     Platelet Assessment  Automated Count Confirmed. Platelet morphology is  normal.     Automated Count Confirmed. Platelet morphology is normal.   ECG 12-LEAD WITH MUSE (L[facility])   Result Value Ref Range    Systolic Blood Pressure 131 mmHg    Diastolic Blood Pressure 71 mmHg    Ventricular Rate 57 BPM    Atrial Rate 57 BPM    NM Interval 160 ms    QRS Duration 110 ms     ms    QTc 438 ms    P Axis 69 degrees    R AXIS 67 degrees    T Axis 56 degrees    Interpretation ECG       Sinus bradycardia  Otherwise normal ECG  No previous ECGs available  Confirmed by SEE ED PROVIDER NOTE FOR, ECG INTERPRETATION ([id number]),  KINSEY FERRELL ([id number]) on 2/11/2024 11:14:52 AM         RADIOLOGY:  Reviewed all pertinent imaging. Please see official radiology report.  XR Chest Port 1 View   Final Result   IMPRESSION:      Lung volumes are low, with associated hypoventilatory changes. No airspace opacities, pleural effusions, or pneumothorax. Normal pulmonary vascularity. Nonenlarged cardiac silhouette. Aortic atherosclerotic calcifications.      XR Pelvis 1/2 Views   Final Result   IMPRESSION: Acute comminuted mildly impacted intertrochanteric right femoral neck fracture with medial displacement of the lesser trochanter fracture fragment.      Mild degenerative arthrosis both hips. Degenerative changes both SI joints. Lower lumbar facet arthropathy.      Small corticated curvilinear ossific fragment posterior to the proximal tibial metaphysis which may be artifactual related to positioning, or sequela of remote trauma. Mild narrowing of the medial compartment of the right knee. No right knee joint    effusion.       NOTE: ABNORMAL REPORT      THE DICTATION ABOVE DESCRIBES AN ABNORMALITY FOR WHICH FOLLOW-UP IS NEEDED.      XR Knee Right 1/2 Views   Final Result   IMPRESSION: Acute comminuted mildly impacted intertrochanteric right femoral neck fracture with medial displacement of the lesser trochanter fracture fragment.      Mild degenerative arthrosis both hips. Degenerative changes both SI joints. Lower  lumbar facet arthropathy.      Small corticated curvilinear ossific fragment posterior to the proximal tibial metaphysis which may be artifactual related to positioning, or sequela of remote trauma. Mild narrowing of the medial compartment of the right knee. No right knee joint    effusion.       NOTE: ABNORMAL REPORT      THE DICTATION ABOVE DESCRIBES AN ABNORMALITY FOR WHICH FOLLOW-UP IS NEEDED.      XR Femur Right 2 Views   Final Result   IMPRESSION: Acute comminuted mildly impacted intertrochanteric right femoral neck fracture with medial displacement of the lesser trochanter fracture fragment.      Mild degenerative arthrosis both hips. Degenerative changes both SI joints. Lower lumbar facet arthropathy.      Small corticated curvilinear ossific fragment posterior to the proximal tibial metaphysis which may be artifactual related to positioning, or sequela of remote trauma. Mild narrowing of the medial compartment of the right knee. No right knee joint    effusion.       NOTE: ABNORMAL REPORT      THE DICTATION ABOVE DESCRIBES AN ABNORMALITY FOR WHICH FOLLOW-UP IS NEEDED.      CT Hip Right w/o Contrast    (Results Pending)     EKG:  Performed at: 1058    Impression: Sinus bradycardia. No ST elevation.  Rate: 57  Rhythm: Sinus  QRS Interval: 110  QTc Interval: 438  Axis: 67  Comparison: None    All laboratory and imaging results and EKG's were personally reviewed and interpreted by myself prior to disposition decision.       ITre, am serving as a scribe to document services personally performed by Stanley Lin MD based on my observation and the provider's statements to me. IRiley Steven J, MD, attest that Tre Hilton is acting in a scribe capacity, has observed my performance of the services and has documented them in accordance with my direction.    Stanley Lin MD  [facility] EMERGENCY ROOM  [address] [id number] [phone]      Riley  Stanley MORGAN MD  02/11/24 4050

## 2024-02-11 NOTE — ED TRIAGE NOTES
Pt presents to the ED via EMS after slipping and falling onto a concrete floor going down stairs. Endorses right hip pain. Denies hitting head, loss of consciousness or blood thinners. Denies other injuries. CMS intact. PIV started by EMS and 100mcg Fentanyl given. Pain in hip improved from 9 to a 2.      Triage Assessment (Adult)       Row Name 02/11/24 0909          Triage Assessment    Airway WDL WDL        Respiratory WDL    Respiratory WDL WDL        Skin Circulation/Temperature WDL    Skin Circulation/Temperature WDL WDL        Cardiac WDL    Cardiac WDL WDL        Peripheral/Neurovascular WDL    Peripheral Neurovascular WDL WDL        Cognitive/Neuro/Behavioral WDL    Cognitive/Neuro/Behavioral WDL WDL

## 2024-02-11 NOTE — H&P
"[facility]    History and Physical - Hospitalist Service       Date of Admission:  2/11/2024    Assessment & Plan      To Hearn is a 63 year old male admitted on 2/11/2024.  He has a past medical history significant for tobacco use disorder, dyslipidemia, coronary artery disease status post LAD stents 2021 who presented to the hospital with right hip pain after mechanical fall. He was found to have right intertrochanteric femur fracture.  Underwent femoral nailing on 2/11.    Mechanical fall  Right intertrochanteric femur fracture status post nailing on 2/11    Plan  -DVT prophylaxis with aspirin 81 mg twice daily as per orthopedic surgery  -Pain management with Dilaudid and Tylenol as per orthopedic surgery  -CT head given reported possible head trauma    Tobacco use  -Smokes about half a pack per day since the age of 17  -Declined nicotine patch    History of coronary artery disease  -Stent to LAD around 3 years ago  -Currently on aspirin 81 mg daily rosuvastatin 40 mg daily  -No chest pain or shortness of breath    Plan  -Aspirin 81 mg twice daily for DVT prophylaxis as per problem #1  -Continue home rosuvastatin    History of submacular hemorrhage          Diet: Combination Diet Regular Diet Adult    DVT Prophylaxis: Pneumatic Compression Devices  Jim Catheter: PRESENT, indication: Anesthesia  Lines: None     Cardiac Monitoring: None  Code Status: Full Code      Clinically Significant Risk Factors Present on Admission                # Drug Induced Platelet Defect: home medication list includes an antiplatelet medication        # Overweight: Estimated body mass index is 28.17 kg/m  as calculated from the following:    Height as of this encounter: 1.803 m (5' 11\").    Weight as of this encounter: 91.6 kg (202 lb).         # Financial/Environmental Concerns: none         Disposition Plan pending PT OT evaluation     Expected Discharge Date: 02/13/2024      Destination: home " with family;home with help/services            CHANTELLE FELDMAN MD  Hospitalist Service  [facility]  Securely message with [employer] (more info)  Text page via [facility] Paging/Directory     ______________________________________________________________________    Chief Complaint   Right hip pain    History is obtained from the patient    History of Present Illness   To Hearn is a 63 year old male admitted on 2/11/2024.  He has a past medical history significant for tobacco use disorder, dyslipidemia, coronary artery disease status post LAD stents 2021 who presented to the hospital with right hip pain.    Patient was in usual state of health on 2/11/2024.  He was walking down the stairs when he slipped and fell landing on his right hip.  He reported minimal head trauma.  He denies any chest pain, shortness of breath, lightheadedness, syncope or any other concerning symptoms.  He denies any fever or chills.    Vitals on presentation: Blood pressure around 120/70, heart rate around 50, SpO2 92% on room air.  BMP largely markable.  CBC showed mild leukocytosis most likely reactive.      Past Medical History    Past Medical History:   Diagnosis Date    NO ACTIVE PROBLEMS        Past Surgical History   Past Surgical History:   Procedure Laterality Date    ARTHROSCOPY KNEE  11/10/2011    Procedure:ARTHROSCOPY KNEE; Right knee arthroscopy, meniscal debridement, choice anesthesia; Surgeon:MELVINA ALFORD; Location:[facility] OR    COLONOSCOPY  10/2011    COLONOSCOPY  10/3/2011    Procedure:COMBINED COLONOSCOPY, SINGLE BIOPSY/POLYPECTOMY BY BIOPSY; Colonoscopy, screening; Surgeon:JILLIAN FROST; Location:[facility] OR    HC KNEE SCOPE,MED/LAT MENISECTOMY  11/10/2011    Rt knee scope, partial MM    TONSILLECTOMY  1966 or 1967       Prior to Admission Medications   Prior to Admission Medications   Prescriptions Last Dose Informant Patient Reported? Taking?   Magnesium Oxide 250 MG TABS 2/10/2024  Yes Yes    Sig: Take 250 tablets by mouth daily   acetaminophen (TYLENOL) 500 MG tablet 2/10/2024  Yes Yes   Sig: Take 500-1,000 mg by mouth every 4 hours as needed for mild pain   aspirin 81 MG EC tablet 2/10/2024  Yes Yes   Sig: Take 81 mg by mouth daily   calcium carbonate (OS-FAROOQ) 500 MG tablet 2/10/2024  Yes Yes   Sig: Take 1 tablet by mouth daily   multivitamin w/minerals (MULTI-VITAMIN) tablet 2/10/2024  Yes Yes   Sig: Take 1 tablet by mouth daily   ranibizumab (LUCENTIS) 0.5 MG/0.05ML SOLN Past Week  Yes Yes   Si.5 mg by Intravitreal route every 28 days   rosuvastatin (CRESTOR) 40 MG tablet 2/10/2024  Yes Yes   Sig: Take 40 mg by mouth daily   vitamin C (ASCORBIC ACID) 250 MG tablet 2/10/2024  Yes Yes   Sig: Take 250 mg by mouth daily      Facility-Administered Medications: None          Physical Exam   Vital Signs: Temp: 97  F (36.1  C) Temp src: Temporal BP: 107/68 Pulse: 71   Resp: 15 SpO2: 93 % O2 Device: None (Room air) Oxygen Delivery: 3 LPM  Weight: 202 lbs 0 oz    Physical Exam  Constitutional:       General: He is not in acute distress.     Appearance: He is not ill-appearing or toxic-appearing.   Cardiovascular:      Rate and Rhythm: Normal rate.      Heart sounds: No murmur heard.  Pulmonary:      Effort: Pulmonary effort is normal. No respiratory distress.      Breath sounds: No wheezing.   Musculoskeletal:      Right lower leg: No edema.      Left lower leg: No edema.   Skin:     General: Skin is warm and dry.   Neurological:      Mental Status: He is alert.          Medical Decision Making       60 MINUTES SPENT BY ME on the date of service doing chart review, history, exam, documentation & further activities per the note.      Data     I have personally reviewed the following data over the past 24 hrs:    12.8 (H)  \   14.7   / 160     137 104 16.6 /  101 (H)   4.8 25 1.00 \     INR:  1.04 PTT:  N/A   D-dimer:  N/A Fibrinogen:  N/A       Imaging results reviewed over the past 24 hrs:   Recent  Results (from the past 24 hour(s))   XR Femur Right 2 Views    Narrative    EXAM: XR FEMUR RIGHT 2 VIEWS, XR KNEE RIGHT 1/2 VIEWS, XR PELVIS 1/2 VIEWS  LOCATION: [facility]  DATE: 2/11/2024    INDICATION: fall, distal femur pain  COMPARISON: Knee radiographs 11/06/2023 and left hip radiographs 03/15/2020      Impression    IMPRESSION: Acute comminuted mildly impacted intertrochanteric right femoral neck fracture with medial displacement of the lesser trochanter fracture fragment.    Mild degenerative arthrosis both hips. Degenerative changes both SI joints. Lower lumbar facet arthropathy.    Small corticated curvilinear ossific fragment posterior to the proximal tibial metaphysis which may be artifactual related to positioning, or sequela of remote trauma. Mild narrowing of the medial compartment of the right knee. No right knee joint   effusion.     NOTE: ABNORMAL REPORT    THE DICTATION ABOVE DESCRIBES AN ABNORMALITY FOR WHICH FOLLOW-UP IS NEEDED.   XR Knee Right 1/2 Views    Narrative    EXAM: XR FEMUR RIGHT 2 VIEWS, XR KNEE RIGHT 1/2 VIEWS, XR PELVIS 1/2 VIEWS  LOCATION: [facility]  DATE: 2/11/2024    INDICATION: fall, distal femur pain  COMPARISON: Knee radiographs 11/06/2023 and left hip radiographs 03/15/2020      Impression    IMPRESSION: Acute comminuted mildly impacted intertrochanteric right femoral neck fracture with medial displacement of the lesser trochanter fracture fragment.    Mild degenerative arthrosis both hips. Degenerative changes both SI joints. Lower lumbar facet arthropathy.    Small corticated curvilinear ossific fragment posterior to the proximal tibial metaphysis which may be artifactual related to positioning, or sequela of remote trauma. Mild narrowing of the medial compartment of the right knee. No right knee joint   effusion.     NOTE: ABNORMAL REPORT    THE DICTATION ABOVE DESCRIBES AN ABNORMALITY FOR WHICH FOLLOW-UP IS NEEDED.  "  XR Pelvis 1/2 Views    Narrative    EXAM: XR FEMUR RIGHT 2 VIEWS, XR KNEE RIGHT 1/2 VIEWS, XR PELVIS 1/2 VIEWS  LOCATION: [facility]  DATE: 2/11/2024    INDICATION: fall, distal femur pain  COMPARISON: Knee radiographs 11/06/2023 and left hip radiographs 03/15/2020      Impression    IMPRESSION: Acute comminuted mildly impacted intertrochanteric right femoral neck fracture with medial displacement of the lesser trochanter fracture fragment.    Mild degenerative arthrosis both hips. Degenerative changes both SI joints. Lower lumbar facet arthropathy.    Small corticated curvilinear ossific fragment posterior to the proximal tibial metaphysis which may be artifactual related to positioning, or sequela of remote trauma. Mild narrowing of the medial compartment of the right knee. No right knee joint   effusion.     NOTE: ABNORMAL REPORT    THE DICTATION ABOVE DESCRIBES AN ABNORMALITY FOR WHICH FOLLOW-UP IS NEEDED.   XR Chest Port 1 View    Narrative    EXAM: XR CHEST PORT 1 VIEW  LOCATION: [facility]  DATE: 2/11/2024    INDICATION: Preoperative planning. Trauma.  COMPARISON: Chest radiograph 01/29/2023.      Impression    IMPRESSION:    Lung volumes are low, with associated hypoventilatory changes. No airspace opacities, pleural effusions, or pneumothorax. Normal pulmonary vascularity. Nonenlarged cardiac silhouette. Aortic atherosclerotic calcifications.   POC US Guidance Needle Placement    Narrative    Ultrasound was performed as guidance to an anesthesia procedure.  Click   \"PACS images\" hyperlink below to view any stored images.  For specific   procedure details, view procedure note authored by anesthesia.   CT Hip Right w/o Contrast    Narrative    EXAM: CT HIP RIGHT WITHOUT CONTRAST  LOCATION: [facility]  DATE: 02/11/2024    INDICATION: Follow-up fracture, surgical planning.  COMPARISON: None.  TECHNIQUE: Noncontrast. Axial, sagittal " and coronal thin-section reconstruction. Dose reduction techniques were used.     FINDINGS:     BONES:  -Comminuted intertrochanteric fracture of the right hip. Slight impaction along the inferomedial margin of the fracture line. No evidence for dislocation. Mild degenerative change at the right hip joint itself. Slight distraction of the main fracture   fragment involving the lesser trochanter, identified on the axial source imaging series 2, image 69.    The visualized portion of the right hemipelvis is negative for fracture. There is near ankylosis of the right SI joint.    SOFT TISSUES:  -Normal.      Impression    IMPRESSION:  1.  Comminuted intertrochanteric fracture of the right hip with slight impaction along the inferomedial aspect of the fracture and slight distraction of the main fracture fragment involving the lesser trochanter.  2.  No evidence for dislocation of the right hip joint itself.  3.  Mild degenerative change at the right hip joint itself.  4.  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  5.  No additional fractures are identified.  6.  No significant effusion.  7.  No significant hematoma or fluid collection.       XR Surgery AB  Fluoro G/T 5 Min    Narrative    This exam was marked as non-reportable because it will not be read by a   radiologist or a [place] non-radiologist provider.

## 2024-02-11 NOTE — ANESTHESIA PROCEDURE NOTES
"Intrathecal Procedure Note    Pre-Procedure   Staff -        Anesthesiologist:  Mirza Whitehead MD       Performed By: anesthesiologist       Location: OR       Procedure Start/Stop Times: 2/11/2024 1:33 PM and 2/11/2024 1:36 PM       Pre-Anesthestic Checklist: patient identified, IV checked, risks and benefits discussed, informed consent, monitors and equipment checked, pre-op evaluation and at physician/surgeon's request  Timeout:       Correct Patient: Yes        Correct Procedure: Yes        Correct Site: Yes        Correct Position: Yes   Procedure Documentation  Procedure: intrathecal       Patient Position: sitting       Patient Prep/Sterile Barriers: sterile gloves, mask, patient draped       Skin prep: Chloraprep       Insertion Site: L3-4. (midline approach).       Needle Gauge: 22.        Needle Length (Inches): 3.5        Spinal Needle Type: Carmen tip       Introducer used       Introducer: 20 G       # of attempts: 1 and  # of redirects:     Assessment/Narrative         Paresthesias: No.       CSF fluid: clear.    Medication(s) Administered   0.5% Bupivacaine PF (Intrathecal) - Intrathecal   3 mL - 2/11/2024 1:33:00 PM  Medication Administration Time: 2/11/2024 1:33 PM      FOR [facility]:   Pain Team Contact information: please page the Pain Team Via [employer]. Search \"Pain\". During daytime hours, please page the attending first. At night please page the resident first.      "

## 2024-02-11 NOTE — BRIEF OP NOTE
[facility]    Brief Operative Note    Pre-operative diagnosis: Closed fracture of right hip, initial encounter (H) [S72.001A]  Post-operative diagnosis Same as pre-operative diagnosis    Procedure: INTERNAL FIXATION, FRACTURE, TROCHANTERIC, HIP, USING PINS OR RODS, Right - Hip    Surgeon: Surgeon(s) and Role:     * Raffi Teresa MD - Primary     * Laura Hughes PA-C  Anesthesia: Choice with Block   Estimated Blood Loss: Less than 100 ml    Drains: None  Specimens: * No specimens in log *  Findings:   Fracture  Complications: None.  Implants:   Implant Name Type Inv. Item Serial No.  Lot No. LRB No. Used Action   11 mm 130 deg TFNA 235 mm right    SYNTHES [id number] Right 1 Implanted   IMP SCR SYN TFNA FENESTRATED LAG 110MM 04.038.210S - [id number] Metallic Hardware/[place] IMP SCR SYN TFNA FENESTRATED LAG 110MM 04.038.210S  SYNTHES-STRATEC [id number] Right 1 Implanted   SCREW BN 38MM 5MM LCK X25 IM NL 04.045.038S - [id number] Metallic Hardware/[place] SCREW BN 38MM 5MM LCK X25 IM NL [id number]  SYNTHES-STRATEC [id number] Right 1 Implanted       WBAT  24 hrs post op ancef  DVT ppx: [id number] [id number] for 4 weeks, SCDs, early mobilization  PT/OT  Dispo: pending PT/OT    RAFFI TERESA MD

## 2024-02-12 ENCOUNTER — APPOINTMENT (OUTPATIENT)
Dept: OCCUPATIONAL THERAPY | Facility: CLINIC | Age: 64
DRG: 481 | End: 2024-02-12
Attending: STUDENT IN AN ORGANIZED HEALTH CARE EDUCATION/TRAINING PROGRAM
Payer: COMMERCIAL

## 2024-02-12 ENCOUNTER — APPOINTMENT (OUTPATIENT)
Dept: PHYSICAL THERAPY | Facility: CLINIC | Age: 64
DRG: 481 | End: 2024-02-12
Attending: STUDENT IN AN ORGANIZED HEALTH CARE EDUCATION/TRAINING PROGRAM
Payer: COMMERCIAL

## 2024-02-12 ENCOUNTER — APPOINTMENT (OUTPATIENT)
Dept: PHYSICAL THERAPY | Facility: CLINIC | Age: 64
DRG: 481 | End: 2024-02-12
Payer: COMMERCIAL

## 2024-02-12 VITALS
RESPIRATION RATE: 16 BRPM | TEMPERATURE: 98.1 F | DIASTOLIC BLOOD PRESSURE: 72 MMHG | HEART RATE: 73 BPM | WEIGHT: 202 LBS | BODY MASS INDEX: 28.28 KG/M2 | HEIGHT: 71 IN | OXYGEN SATURATION: 96 % | SYSTOLIC BLOOD PRESSURE: 132 MMHG

## 2024-02-12 LAB
BASOPHILS # BLD AUTO: ABNORMAL 10*3/UL
BASOPHILS # BLD MANUAL: 0 10E3/UL (ref 0–0.2)
BASOPHILS NFR BLD AUTO: ABNORMAL %
BASOPHILS NFR BLD MANUAL: 0 %
EOSINOPHIL # BLD AUTO: ABNORMAL 10*3/UL
EOSINOPHIL # BLD MANUAL: 0 10E3/UL (ref 0–0.7)
EOSINOPHIL NFR BLD AUTO: ABNORMAL %
EOSINOPHIL NFR BLD MANUAL: 0 %
ERYTHROCYTE [DISTWIDTH] IN BLOOD BY AUTOMATED COUNT: 13.6 % (ref 10–15)
GLUCOSE SERPL-MCNC: 130 MG/DL (ref 70–99)
HCT VFR BLD AUTO: 38.9 % (ref 40–53)
HGB BLD-MCNC: 12.9 G/DL (ref 13.3–17.7)
IMM GRANULOCYTES # BLD: ABNORMAL 10*3/UL
IMM GRANULOCYTES NFR BLD: ABNORMAL %
LYMPHOCYTES # BLD AUTO: ABNORMAL 10*3/UL
LYMPHOCYTES # BLD MANUAL: 1.8 10E3/UL (ref 0.8–5.3)
LYMPHOCYTES NFR BLD AUTO: ABNORMAL %
LYMPHOCYTES NFR BLD MANUAL: 12 %
MCH RBC QN AUTO: 31.3 PG (ref 26.5–33)
MCHC RBC AUTO-ENTMCNC: 33.2 G/DL (ref 31.5–36.5)
MCV RBC AUTO: 94 FL (ref 78–100)
METAMYELOCYTES # BLD MANUAL: 0.2 10E3/UL
METAMYELOCYTES NFR BLD MANUAL: 1 %
MONOCYTES # BLD AUTO: ABNORMAL 10*3/UL
MONOCYTES # BLD MANUAL: 0.6 10E3/UL (ref 0–1.3)
MONOCYTES NFR BLD AUTO: ABNORMAL %
MONOCYTES NFR BLD MANUAL: 4 %
NEUTROPHILS # BLD AUTO: ABNORMAL 10*3/UL
NEUTROPHILS # BLD MANUAL: 12.5 10E3/UL (ref 1.6–8.3)
NEUTROPHILS NFR BLD AUTO: ABNORMAL %
NEUTROPHILS NFR BLD MANUAL: 83 %
NRBC # BLD AUTO: 0 10E3/UL
NRBC BLD AUTO-RTO: 0 /100
PLAT MORPH BLD: ABNORMAL
PLATELET # BLD AUTO: 146 10E3/UL (ref 150–450)
RBC # BLD AUTO: 4.12 10E6/UL (ref 4.4–5.9)
RBC MORPH BLD: ABNORMAL
VARIANT LYMPHS BLD QL SMEAR: PRESENT
WBC # BLD AUTO: 15.1 10E3/UL (ref 4–11)

## 2024-02-12 PROCEDURE — 85007 BL SMEAR W/DIFF WBC COUNT: CPT | Performed by: STUDENT IN AN ORGANIZED HEALTH CARE EDUCATION/TRAINING PROGRAM

## 2024-02-12 PROCEDURE — 250N000011 HC RX IP 250 OP 636: Performed by: STUDENT IN AN ORGANIZED HEALTH CARE EDUCATION/TRAINING PROGRAM

## 2024-02-12 PROCEDURE — 97535 SELF CARE MNGMENT TRAINING: CPT | Mod: GO

## 2024-02-12 PROCEDURE — 82947 ASSAY GLUCOSE BLOOD QUANT: CPT | Performed by: STUDENT IN AN ORGANIZED HEALTH CARE EDUCATION/TRAINING PROGRAM

## 2024-02-12 PROCEDURE — 250N000013 HC RX MED GY IP 250 OP 250 PS 637: Performed by: STUDENT IN AN ORGANIZED HEALTH CARE EDUCATION/TRAINING PROGRAM

## 2024-02-12 PROCEDURE — 97110 THERAPEUTIC EXERCISES: CPT | Mod: GP

## 2024-02-12 PROCEDURE — 36415 COLL VENOUS BLD VENIPUNCTURE: CPT | Performed by: STUDENT IN AN ORGANIZED HEALTH CARE EDUCATION/TRAINING PROGRAM

## 2024-02-12 PROCEDURE — 99239 HOSP IP/OBS DSCHRG MGMT >30: CPT | Performed by: STUDENT IN AN ORGANIZED HEALTH CARE EDUCATION/TRAINING PROGRAM

## 2024-02-12 PROCEDURE — 97166 OT EVAL MOD COMPLEX 45 MIN: CPT | Mod: GO

## 2024-02-12 PROCEDURE — 85027 COMPLETE CBC AUTOMATED: CPT | Performed by: STUDENT IN AN ORGANIZED HEALTH CARE EDUCATION/TRAINING PROGRAM

## 2024-02-12 PROCEDURE — 97116 GAIT TRAINING THERAPY: CPT | Mod: GP

## 2024-02-12 PROCEDURE — 97161 PT EVAL LOW COMPLEX 20 MIN: CPT | Mod: GP

## 2024-02-12 RX ORDER — OXYCODONE HYDROCHLORIDE 5 MG/1
5 TABLET ORAL EVERY 4 HOURS PRN
Qty: 26 TABLET | Refills: 0 | Status: SHIPPED | OUTPATIENT
Start: 2024-02-12

## 2024-02-12 RX ORDER — AMOXICILLIN 250 MG
1 CAPSULE ORAL 2 TIMES DAILY
Qty: 60 TABLET | Refills: 0 | Status: SHIPPED | OUTPATIENT
Start: 2024-02-12

## 2024-02-12 RX ORDER — ASPIRIN 81 MG/1
81 TABLET ORAL 2 TIMES DAILY
Qty: 60 TABLET | Refills: 0 | Status: SHIPPED | OUTPATIENT
Start: 2024-02-12

## 2024-02-12 RX ORDER — ACETAMINOPHEN 325 MG/1
650 TABLET ORAL EVERY 4 HOURS PRN
Qty: 100 TABLET | Refills: 0 | Status: SHIPPED | OUTPATIENT
Start: 2024-02-14

## 2024-02-12 RX ADMIN — POLYETHYLENE GLYCOL 3350 17 G: 17 POWDER, FOR SOLUTION ORAL at 08:25

## 2024-02-12 RX ADMIN — CEFAZOLIN SODIUM 2 G: 2 INJECTION, SOLUTION INTRAVENOUS at 05:09

## 2024-02-12 RX ADMIN — SENNOSIDES AND DOCUSATE SODIUM 1 TABLET: 8.6; 5 TABLET ORAL at 08:25

## 2024-02-12 RX ADMIN — ASPIRIN 81 MG: 81 TABLET, COATED ORAL at 08:25

## 2024-02-12 RX ADMIN — OXYCODONE HYDROCHLORIDE 5 MG: 5 TABLET ORAL at 09:52

## 2024-02-12 RX ADMIN — ACETAMINOPHEN 975 MG: 325 TABLET ORAL at 08:25

## 2024-02-12 RX ADMIN — ROSUVASTATIN CALCIUM 40 MG: 10 TABLET, FILM COATED ORAL at 08:25

## 2024-02-12 ASSESSMENT — ACTIVITIES OF DAILY LIVING (ADL)
PREVIOUS_RESPONSIBILITIES: MEAL PREP;HOUSEKEEPING;LAUNDRY;SHOPPING;MEDICATION MANAGEMENT;FINANCES;DRIVING;WORK
ADLS_ACUITY_SCORE: 36
ADLS_ACUITY_SCORE: 33
ADLS_ACUITY_SCORE: 34
ADLS_ACUITY_SCORE: 34
ADLS_ACUITY_SCORE: 33
ADLS_ACUITY_SCORE: 34
ADLS_ACUITY_SCORE: 33

## 2024-02-12 NOTE — PLAN OF CARE
Patient ready for discharge. Discharge instructions discussed and questions answered. All belongings sent with patient.

## 2024-02-12 NOTE — PROGRESS NOTES
"   02/12/24 0830   Appointment Info   Signing Clinician's Name / Credentials (PT) Jud Ruggiero, PT, DPT   Living Environment   People in Home spouse   Current Living Arrangements other (see comments)  ([facility])   Home Accessibility stairs to enter home   Number of Stairs, Main Entrance 2   Stair Railings, Main Entrance none   Self-Care   Equipment Currently Used at Home none  (has a FWW)   Fall history within last six months yes   Number of times patient has fallen within last six months 1   Activity/Exercise/Self-Care Comment pt reports being independent with functinoal mobility at baseline   General Information   Onset of Illness/Injury or Date of Surgery 02/11/24   Referring Physician Antoni Irvin MD   Patient/Family Therapy Goals Statement (PT) Return to Home   Pertinent History of Current Problem (include personal factors and/or comorbidities that impact the POC) Per Chart Review -\"63 year old male admitted on 2/11/2024.  He has a past medical history significant for tobacco use disorder, dyslipidemia, coronary artery disease status post LAD stents 2021 who presented to the hospital with right hip pain after mechanical fall. He was found to have right intertrochanteric femur fracture.  Underwent femoral nailing on 2/11.\"   Existing Precautions/Restrictions no known precautions/restrictions;weight bearing   Weight-Bearing Status - RLE weight-bearing as tolerated   Range of Motion (ROM)   Range of Motion ROM deficits secondary to pain   Strength (Manual Muscle Testing)   Strength (Manual Muscle Testing) Deficits observed during functional mobility   Transfers   Transfers sit-stand transfer   Maintains Weight-bearing Status (Transfers) able to maintain   Sit-Stand Transfer   Sit-Stand Winn (Transfers) supervision;verbal cues;contact guard   Assistive Device (Sit-Stand Transfers) walker, front-wheeled   Gait/Stairs (Locomotion)   Winn Level (Gait) supervision;verbal cues;contact guard "   Assistive Device (Gait) walker, front-wheeled   Distance in Feet (Gait) 5   Pattern (Gait) step-to   Deviations/Abnormal Patterns (Gait) antalgic;gait speed decreased   Maintains Weight-bearing Status (Gait) able to maintain   Clinical Impression   Criteria for Skilled Therapeutic Intervention Yes, treatment indicated   PT Diagnosis (PT) Impaired functional mobility   Influenced by the following impairments weakness, decreased ROM, impaired balance, pain   Functional limitations due to impairments gait, transfers, stairs   Clinical Presentation (PT Evaluation Complexity) stable   Clinical Presentation Rationale pt presents as medically diagnosed   Clinical Decision Making (Complexity) low complexity   Planned Therapy Interventions (PT) balance training;gait training;home exercise program;ROM (range of motion);stair training;strengthening;transfer training   Risk & Benefits of therapy have been explained evaluation/treatment results reviewed;patient   PT Total Evaluation Time   PT Eval, Low Complexity Minutes (17658) 10   Physical Therapy Goals   PT Frequency Daily   PT Predicted Duration/Target Date for Goal Attainment 02/14/24   PT Goals Transfers;Gait;Stairs;PT Goal 1   PT: Transfers Modified independent;Sit to/from stand;Assistive device;Within precautions   PT: Gait Modified independent;Rolling walker;Within precautions;150 feet   PT: Stairs Supervision/stand-by assist;Within precautions;2 stairs   PT: Goal 1 pt will be mod I with HEP   Interventions   Interventions Quick Adds Gait Training;Therapeutic Activity;Therapeutic Procedure   Therapeutic Procedure/Exercise   Ther. Procedure: strength, endurance, ROM, flexibillity Minutes (73644) 10   Symptoms Noted During/After Treatment fatigue   Treatment Detail/Skilled Intervention Reclined & Seated LE therex: 8-10 reps, cueing for safety/technique/specific muscle activation, SBA - SLR limited d/t pain   Therapeutic Activity   Treatment Detail/Skilled Intervention  Sit to/from stand: cueing for safety/FWW management/hand placement on stable surface, CGA   Gait Training   Gait Training Minutes (16085) 12   Symptoms Noted During/After Treatment (Gait Training) fatigue   Treatment Detail/Skilled Intervention Cueing for safety/technique/avoiding stepping in front of FWW/keeping FWW on ground. Pt ambulated with a slow antalgic gait pattern, increased stepping in front of FWW noted. Seated rest break.  Held stair trial d/t pt reporting numbness in LE - RN notified   Distance in Feet 110 x2   [place] Level (Gait Training) other (see comments)  (SBA to CGA)   Physical Assistance Level (Gait Training) supervision;verbal cues   Weight Bearing (Gait Training) weight-bearing as tolerated   Assistive Device (Gait Training) rolling walker   Pattern Analysis (Gait Training) swing-through gait   Gait Analysis Deviations decreased galindo;decreased step length   Impairments (Gait Analysis/Training) pain;balance impaired   PT Discharge Planning   PT Plan Stairs, gait training with FWW, LE therex   PT Discharge Recommendation (DC Rec) home with assist   PT Rationale for DC Rec pending safe stair trial, anticipate pt will be able to complete required functional mobility at home   PT Brief overview of current status SBA to CGA gait with  x2, CGA transfers   PT Equipment Needed at Discharge walker, rolling  (pt owns FWW)   Total Session Time   Timed Code Treatment Minutes 22   Total Session Time (sum of timed and untimed services) 32

## 2024-02-12 NOTE — PLAN OF CARE
Goal Outcome Evaluation:  Patient vital signs are at baseline: Yes  Patient able to ambulate as they were prior to admission or with assist devices provided by therapies during their stay:  Yes  Patient MUST void prior to discharge:  No,  Reason:  Pt was experiencing bilateral numbness. Jim taken out after pt ambulated. Pt will continue to be monitored for urinary retention.         Patient able to tolerate oral intake:  Yes  Pain has adequate pain control using Oral analgesics:  No. IV dilaudid given to control breakthrough postop pain.   Does patient have an identified :  Yes  Has goal D/C date and time been discussed with patient:  Yes

## 2024-02-12 NOTE — PROGRESS NOTES
02/12/24 0730   Appointment Info   Signing Clinician's Name / Credentials (OT) Staci Jenkins OTR/L   Quick Adds   Quick Adds Certification   Living Environment   People in Home spouse   Current Living Arrangements house  (townhouse - can stay on one level.)   Living Environment Comments WIS with built in Shower chair, RTS with vanity on the L   Self-Care   Usual Activity Tolerance good   Current Activity Tolerance moderate   Instrumental Activities of Daily Living (IADL)   Previous Responsibilities meal prep;housekeeping;laundry;shopping;medication management;finances;driving;work   IADL Comments Wife and Pt share IADLs   General Information   Onset of Illness/Injury or Date of Surgery 02/11/24   Referring Physician Dr. Antoni Irvin   Patient/Family Therapy Goal Statement (OT) To return home with family.   Additional Occupational Profile Info/Pertinent History of Current Problem Adm for R Hip Fx Repair - ORIF.  PMH:  Tobacco Use, Dyslip, CAD - LAD stent   Right Lower Extremity (Weight-bearing Status) weight-bearing as tolerated (WBAT)   Cognitive Status Examination   Follows Commands follows multi-step commands   Visual Perception   Visual Impairment/Limitations corrective lenses full-time   Bed Mobility   Bed Mobility supine-sit;sit-supine   Supine-Sit [place] (Bed Mobility) supervision;verbal cues   Sit-Supine [place] (Bed Mobility) supervision;verbal cues   Transfers   Transfers bed-chair transfer;sit-stand transfer;toilet transfer   Transfer Skill: Bed to Chair/Chair to Bed   Bed-Chair [place] (Transfers) supervision;verbal cues   Assistive Device (Bed-Chair Transfers) rolling walker   Sit-Stand Transfer   Sit-Stand [place] (Transfers) supervision;verbal cues   Assistive Device (Sit-Stand Transfers) walker, front-wheeled   Toilet Transfer   Type (Toilet Transfer)   (Therapist demo.  Added RTS commode overlay to hospital toilet.)   Balance   Balance Assessment standing dynamic balance    Standing Balance: Dynamic modified independence   Position/Device Used, Standing Balance walker, front-wheeled   Activities of Daily Living   BADL Assessment/Intervention upper body dressing;lower body dressing   Upper Body Dressing Assessment/Training   Position (Upper Body Dressing) supported sitting   [place] Level (Upper Body Dressing) upper body dressing skills;don;pull-over garment;independent   Lower Body Dressing Assessment/Training   Position (Lower Body Dressing) supported sitting;supported standing   [place] Level (Lower Body Dressing) lower body dressing skills;don;pants/bottoms;socks;maximum assist (25% patient effort)   Clinical Impression   Criteria for Skilled Therapeutic Interventions Met (OT) Yes, treatment indicated   OT Diagnosis Decreased indep with ADLs and trsfs due to R NAHEED   OT Problem List-Impairments impacting ADL mobility   Assessment of Occupational Performance 3-5 Performance Deficits   Identified Performance Deficits dressing, toileting, bahting, G/H and trsfs   Planned Therapy Interventions (OT) ADL retraining   Clinical Decision Making Complexity (OT) detailed assessment/moderate complexity   Risk & Benefits of therapy have been explained evaluation/treatment results reviewed;participants included;patient   OT Total Evaluation Time   OT Eval, Moderate Complexity Minutes (21179) 15   Therapy Certification   Medical Diagnosis ORIF of the R hip   Start of Care Date 02/12/24   Certification date from 02/12/24   Certification date to 03/12/24   OT Goals   Therapy Frequency (OT) One time eval and treatment   OT Goals Lower Body Dressing;Transfers   OT: Lower Body Dressing Minimal assist;using adaptive equipment;Completed   OT: Transfer Modified independent;Completed   Interventions   Interventions Quick Adds Self-Care/Home Management   Self-Care/Home Management   Self-Care/Home Mgmt/ADL, Compensatory, Meal Prep Minutes (61561) 30   Symptoms Noted During/After Treatment (Meal  Preparation/Planning Training) none   Treatment Detail/Skilled Intervention Pt sitting in chair when therapist arrived.  Pt's spouse not present.  Reviewed with Pt  the hip precautions of no precautions.  Pt verbalized understanding.  Worked on FB dressing.  Pt was indep with U/B dressing while sitting.  Worked on L/B dressing but too painful to reach his feet.  Pt was educated on the use of a reacher , sock aid and leg  to assist with L/B dressing.  Pt needed min A of one.  Pt reports his wife will be able to assist him at home. Worked on room trsfs using the FWW to the bed, chair,   Initially needing SBA for VC for correct hand placement.  By end of session, Pt was mod indep using FWW.  Pt was shown and practiced bed mobility using pillows between legs when resting on thier L side.  Therapist demo the car trsf and kitchen mobility, toilet and WIS trsfs.  Pt  understood.  At end of session, Pt was sitting in the chair with alarm on and call light within reach.  RN aware.   OT Discharge Planning   OT Plan D/C OT   OT Discharge Recommendation (DC Rec) (S)  other (see comments);home with assist   OT Rationale for DC Rec Wife to assist at home.   OT Brief overview of current status Pt was mod indep with trsfs using FWW.  Min  A with L/B dressing using AE.   OT Equipment Needed at Discharge reacher;other (see comments)  (Leg  and sock aid - wide width)   Total Session Time   Timed Code Treatment Minutes 30   Total Session Time (sum of timed and untimed services) 45    [facility]  OUTPATIENT OCCUPATIONAL THERAPY  EVALUATION  PLAN OF TREATMENT FOR OUTPATIENT REHABILITATION  (COMPLETE FOR INITIAL CLAIMS ONLY)  Patient's Last Name, First Name, M.I.  YOB: 1960  To Hearn                          Provider's Name  [facility] Medical Record No.  [id number]                             Onset Date:  02/11/24   Start of Care Date:   (P) 02/12/24   Type:     ___PT   _X_OT   ___SLP Medical Diagnosis:  (P) ORIF of the R hip                    OT Diagnosis:  Decreased indep with ADLs and trsfs due to R NAHEED Visits from SOC:  1     See note for plan of treatment, functional goals and certification details    I CERTIFY THE NEED FOR THESE SERVICES FURNISHED UNDER        THIS PLAN OF TREATMENT AND WHILE UNDER MY CARE     (Physician co-signature of this document indicates review and certification of the therapy plan).

## 2024-02-12 NOTE — PLAN OF CARE
Goal Outcome Evaluation:  Problem: Adult Inpatient Plan of Care  Goal: Optimal Comfort and Wellbeing  Intervention: Monitor Pain and Promote Comfort  Problem: Hip Fracture Surgical Repair  Goal: Optimal Pain Control and Function  Intervention: Prevent or Manage Pain  Problem: Hip Fracture Surgical Repair  Goal: Effective Oxygenation and Ventilation  Intervention: Optimize Oxygenation and Ventilation  Patient vital signs are at baseline: No,  Reason:  On 4.5L weaned to 1L NC  Patient able to ambulate as they were prior to admission or with assist devices provided by therapies during their stay:  Yes  Patient MUST void prior to discharge:  Yes  Patient able to tolerate oral intake:  Yes  Pain has adequate pain control using Oral analgesics:  Yes  Does patient have an identified :  Yes  Has goal D/C date and time been discussed with patient:  Yes     A&Ox4. VSS, on 4L and weaned down to 1L NC. CMS intact. Rated moderate pain; PRN PO Oxycodone administered. Ice therapy utilized. Dressing clean, dry and intact.  IV saline lock after antibiotic. IS encouraged. Pt assisted of one with gaitbelt and walker.

## 2024-02-12 NOTE — PROGRESS NOTES
Care Management Discharge Note    Discharge Date: 02/12/2024       Discharge Disposition: Home    Discharge Services: None    Discharge DME: None    Discharge Transportation: family or friend will provide (Wife Grazyna will transport patient)    Private pay costs discussed: Not applicable    Does the patient's insurance plan have a 3 day qualifying hospital stay waiver?  No    PAS Confirmation Code: N/A  Patient/family educated on Medicare website which has current facility and service quality ratings: no    Education Provided on the Discharge Plan: Yes  Persons Notified of Discharge Plans: Pt   Patient/Family in Agreement with the Plan: yes    Handoff Referral Completed: No    Additional Information:  Chart reviewed.  Pt has no CM needs identified.  Pt discharging home with assist and will do Out Pt therapy. Family to transport.     1:43 PM  Therapy recommendations for home care PT.  SWCM met with Pt and wife and have no preferences for HC.  Referral sent to [facility] and can accept Pt for PT.  Pt updated.   Pt discharging home with wife.     5:17 PM  [facility] cannot take Pt's insurance,  [facility] has accepted Pt for Home  Care and will call Pt.     JOVANNY Frias

## 2024-02-12 NOTE — DISCHARGE SUMMARY
"[facility]  Hospitalist Discharge Summary      Date of Admission:  2/11/2024  Date of Discharge:  2/12/2024  Discharging Provider: CHANTELLE FELDMAN MD  Discharge Service: Hospitalist Service    Discharge Diagnoses   Mechanical fall  Right intertrochanteric femur fracture status post nailing on 2/11   History of coronary artery disease    Clinically Significant Risk Factors     # Overweight: Estimated body mass index is 28.17 kg/m  as calculated from the following:    Height as of this encounter: 1.803 m (5' 11\").    Weight as of this encounter: 91.6 kg (202 lb).       Follow-ups Needed After Discharge   Follow-up Appointments     Follow Up Care      Follow-up with your Surgeon Team in 2 weeks for wound check.        Follow-up and recommended labs and tests       Follow up with primary care provider, CLINT FULLER, within 7 days   for hospital follow- up.  The following labs/tests are recommended: CBC.            Unresulted Labs Ordered in the Past 30 Days of this Admission       No orders found for last 31 day(s).            Discharge Disposition   Discharged to home  Condition at discharge: Stable    Hospital Course   To Hearn is a 63 year old male admitted on 2/11/2024. He has a past medical history significant for tobacco use disorder, dyslipidemia, coronary artery disease status post LAD stents 2021 who presented to the hospital with right hip pain after mechanical fall. He was found to have right intertrochanteric femur fracture. Underwent internal fixation and femoral nailing on 2/11. No major complications. Recovering well. Cleared for discharge from orthopedic surgery standpoint.  DVT prophylaxis and pain management as per orthopedic surgery team.    Acute anemia due to blood loss  Thrombocytopenia  Leukocytosis  -Hemoglobin on presentation around 15, dropped around 13.  No signs of active bleeding.  -Platelets around 150,000.  -Mild leukocytosis around 15.  No signs " "of active infection.  Most likely reactive.    Plan  -Discussed with the patient, follow-up with primary care physician 1 week and repeat CBC.    Consultations This Hospital Stay   CARE MANAGEMENT / SOCIAL WORK IP CONSULT  PHYSICAL THERAPY ADULT IP CONSULT  OCCUPATIONAL THERAPY ADULT IP CONSULT  PHYSICAL THERAPY ADULT IP CONSULT  OCCUPATIONAL THERAPY ADULT IP CONSULT    Code Status   Full Code    Time Spent on this Encounter   I, CHANTELLE FELDMAN MD, personally saw the patient today and spent greater than 30 minutes discharging this patient.       CHANTELLE FELDMAN MD  [address] [address]  Phone: [phone]  Fax: [phone]  ______________________________________________________________________    Physical Exam   Vital Signs: Temp: 98.1  F (36.7  C) Temp src: Oral BP: 132/72 Pulse: 73   Resp: 16 SpO2: 96 % O2 Device: None (Room air) Oxygen Delivery: 1 LPM  Weight: 202 lbs 0 oz  Physical Exam  Constitutional:       General: He is not in acute distress.     Appearance: He is not ill-appearing or toxic-appearing.   Cardiovascular:      Rate and Rhythm: Normal rate.      Heart sounds: No murmur heard.  Skin:     General: Skin is warm and dry.   Neurological:      Mental Status: He is alert.   Psychiatric:         Mood and Affect: Mood normal.             Primary Care Physician   CLINT FULLER    Discharge Orders      Home Care Referral      Reason for your hospital stay    Hip surgery     When to call - Contact Surgeon Team    You may experience symptoms that require follow-up before your scheduled appointment. Refer to the \"[employer] Tool\" for instructions on when to contact your Surgeon Team if you are concerned about pain control, blood clots, constipation, or if you are unable to urinate.     When to call - Reach out to Urgent Care    If you are not able to reach your Surgeon Team and you need immediate care, go to the Orthopedic Walk-in " Clinic or Urgent Care at your Surgeon's office.  Do NOT go to the Emergency Room unless you have shortness of breath, chest pain, or other signs of a medical emergency.     When to call - Reasons to Call 911    Call 911 immediately if you experience sudden-onset chest pain, arm weakness/numbness, slurred speech, or shortness of breath     Discharge Instruction - Breathing exercises    Perform breathing exercises using your Incentive Spirometer 10 times per hour while awake for 2 weeks.     Symptoms - Fever Management    A low grade fever can be expected after surgery.  Use acetaminophen (TYLENOL) as needed for fever management.  Contact your Surgeon Team if you have a fever greater than 101.5 F, chills, and/or night sweats.     Symptoms - Constipation management    Constipation (hard, dry bowel movements) is expected after surgery due to the combination of being less active, the anesthetic, and the opioid pain medication.  You can do the following to help reduce constipation:  ~  FLUIDS:  Drink clear liquids (water or Gatorade), or juice (apple/prune).  ~  DIET:  Eat a fiber rich diet.    ~  ACTIVITY:  Get up and move around several times a day.  Increase your activity as you are able.  MEDICATIONS:  Reduce the risk of constipation by starting medications before you are constipated.  You can take Miralax   (1 packet as directed) and/or a stool softener (Senokot 1-2 tablets 1-2 times a day).  If you already have constipation and these medications are not working, you can get magnesium citrate and use as directed.  If you continue to have constipation you can try an over the counter suppository or enema.  Call your Surgeon Team if it has been greater than 3 days since your last bowel movement.     Symptoms - Reduced Urine Output    Changes in the amount of fluids you drank before and after surgery may result in problems urinating.  It is important to stay well-hydrated after surgery and drink plenty of water. If it  has been greater than 8 hours since you have urinated despite drinking plenty of water, call your Surgeon Team.     Activity - Exercises to prevent blood clots    Unless otherwise directed by your Surgeon team, perform the following exercises at least three times per day for the first four weeks after surgery to prevent blood clots in your legs: 1) Point and flex your feet (Ankle Pumps), 2) Move your ankle around in big circles, 3) Wiggle your toes, 4) Walk, even for short distances, several times a day, will help decrease the risk of blood clots.     Comfort and Pain Management - Pain after Surgery    Pain after surgery is normal and expected.  You will have some amount of pain for several weeks after surgery.  Your pain will improve with time.  There are several things you can do to help reduce your pain including: rest, ice, elevation, and using pain medications as needed. Contact your Surgeon Team if you have pain that persists or worsens after surgery despite rest, ice, elevation, and taking your medication(s) as prescribed. Contact your Surgeon Team if you have new numbness, tingling, or weakness in your operative extremity.     Comfort and Pain Management - Swelling after Surgery    Swelling and/or bruising of the surgical extremity is common and may persist for several months after surgery. In addition to frequent icing and elevation, gentle compressive support with an ACE wrap or tubigrip may help with swelling. Apply compression regularly, removing at least twice daily to perform skin checks. Contact your Surgeon Team if your swelling increases and is NOT associated with an increase in your activity level, or if your swelling increases and is associated with redness and pain.     Comfort and Pain Management - Cold therapy    Ice can be used to control swelling and discomfort after surgery. Place a thin towel over your operative site and apply the ice pack overtop. Leave ice pack in place for 20 minutes,  then remove for 20 minutes. Repeat this 20 minutes on/20 minutes off routine as often as tolerated.     Medication Instructions - Acetaminophen (TYLENOL) Instructions    You were discharged with acetaminophen (TYLENOL) for pain management after surgery. Acetaminophen most effectively manages pain symptoms when it is taken on a schedule without missing doses (every four, six, or eight hours). Your Provider will prescribe a safe daily dose between 3000 - 4000 mg.  Do NOT exceed this daily dose. Most patients use acetaminophen for pain control for the first four weeks after surgery.  You can wean from this medication as your pain decreases.     Medication Instructions - NSAID Instructions    You were discharged with an anti-inflammatory medication for pain management to use in combination with acetaminophen (TYLENOL) and the narcotic pain medication.  Take this medication exactly as directed.  You should only take one anti-inflammatory at a time.  Some common anti-inflammatories include: ibuprofen (ADVIL, MOTRIN), naproxen (ALEVE, NAPROSYN), celecoxib (CELEBREX), meloxicam (MOBIC), ketorolac (TORADOL).  Take this medication with food and water.     Medication Instructions - Opioids - Tapering Instructions    In the first three days following surgery, your symptoms may warrant use of the narcotic pain medication every four to six hours as prescribed. This is normal. As your pain symptoms improve, focus your efforts on decreasing (tapering) use of narcotic medications. The most successful tapering strategy is to first, decrease the number of tablets you take every 4-6 hours to the minimum prescribed. Then, increase the amount of time between doses.  For example:  First, taper to   or 1 tablet every 4-6 hours.  Then, taper to   or 1 tablet every 6-8 hours.  Then, taper to   or 1 tablet every 8-10 hours.  Then, taper to   or 1 tablet every 10-12 hours.  Then, taper to   or 1 tablet at bedtime.  The bedtime dose can help  "with comfort during sleep and is typically the last dose to be discontinued after surgery.     Follow Up Care    Follow-up with your Surgeon Team in 2 weeks for wound check.     Medication instructions -  Anticoagulation - aspirin    Take the aspirin as prescribed for a total of four weeks after surgery.  This is given to help minimize your risk of blood clot.     Comfort and Pain Management - LOWER Extremity Elevation    Swelling is expected for several months after surgery. This type of swelling is usually associated with gravity and activity, and can be improved with elevation.   The best way to do this is to get your \"toes above your nose\" by laying down and placing several pillows lengthwise under your calf and heel. When elevating your leg keep your knee completely straight. Perform this elevation as often as possible especially for the first two weeks after surgery.     Medication Instructions - Opioid Instructions (1 - 2 tablets Q 4-6 hours, MAX 6 tablets)    You were discharged with an opioid medication (hydromorphone, oxycodone, hydrocodone, or tramadol). This medication should only be taken for breakthrough pain that is not controlled with acetaminophen (TYLENOL). If you rate your pain less than 3 you do not need this medication.  Pain rating 0-3:  You do not need this medication.  Pain rating 4-6:  Take 1 tablet every 4-6 hours as needed  Pain rating 7-10:  Take 2 tablets every 4-6 hours as needed.  Do not exceed 6 tablets per day     Activity - Total Hip Arthroplasty    Refer to the [facility] \"Your Guide to Total Joint Replacement\" for recommendations on activities and Exercises.     Return to Driving    Return to driving - Driving is NOT permitted until directed by your provider. Under no circumstance are you permitted to drive while using narcotic pain medications.     Dressing / Wound Care - Wound    You have a clean dressing on your surgical wound. Dressing change instructions as follows: " dressing will be removed at your follow-up appointment. Contact your Surgeon Team if you have increased redness, warmth around the surgical wound, and/or drainage from the surgical wound.     Dressing / Wound Care - NO Tub Bathing    Tub bathing, swimming, or any other activities that will cause your incision to be submerged in water should be avoided until allowed by your Surgeon.     No precautions    No precautions directed by your Provider.  You may perform range of motion activities as tolerated.     Weight bearing as tolerated    Weight bearing as tolerated on your operative extremity.     Dressing / Wound care - Shower with wound/dressing covered    You must COVER your dressing or incision with saran wrap (or any other non-permeable covering) to allow the incision to remain dry while showering.  You may shower 3 days after surgery as long as the surgical wound stays dry. Continue to cover your dressing or incision for showering until your first office visit.  You are strictly prohibited from soaking or submerging the surgical wound underwater.     Reason for your hospital stay    Right hip fracture due to mechanical fall.     Follow-up and recommended labs and tests     Follow up with primary care provider, CLINT FULLER, within 7 days for hospital follow- up.  The following labs/tests are recommended: CBC.     Crutches DME    DME Documentation: Describe the reason for need to support medical necessity: Impaired gait status post hip surgery. I, the undersigned, certify that the above prescribed supplies are medically necessary for this patient and is both reasonable and necessary in reference to accepted standards of medical practice in the treatment of this patient's condition and is not prescribed as a convenience.     Cane DME    DME Documentation: Describe the reason for need to support medical necessity: Impaired gait status post hip surgery. I, the undersigned, certify that the above prescribed  supplies are medically necessary for this patient and is both reasonable and necessary in reference to accepted standards of medical practice in the treatment of this patient's condition and is not prescribed as a convenience.     Walker DME    DME Documentation: Describe the reason for need to support medical necessity: Impaired gait status post hip surgery. I, the undersigned, certify that the above prescribed supplies are medically necessary for this patient and is both reasonable and necessary in reference to accepted standards of medical practice in the treatment of this patient's condition and is not prescribed as a convenience.     Discharge Instruction - Regular Diet Adult    Return to your pre-surgery diet unless instructed otherwise     Diet    Follow this diet upon discharge: Orders Placed This Encounter      Combination Diet Regular Diet Adult      Discharge Instruction - Regular Diet Adult       Significant Results and Procedures   Most Recent 3 CBC's:  Recent Labs   Lab Test 02/12/24  0630 02/11/24  1104   WBC 15.1* 12.8*   HGB 12.9* 14.7   MCV 94 93   * 160     Most Recent 3 BMP's:  Recent Labs   Lab Test 02/12/24  0630 02/11/24  1227 02/11/24  1104   NA  --   --  137   POTASSIUM  --   --  4.8   CHLORIDE  --   --  104   CO2  --   --  25   BUN  --   --  16.6   CR  --   --  1.00   ANIONGAP  --   --  8   FAROOQ  --   --  9.4   * 101* 112*   ,   Results for orders placed or performed during the hospital encounter of 02/11/24   XR Femur Right 2 Views    Narrative    EXAM: XR FEMUR RIGHT 2 VIEWS, XR KNEE RIGHT 1/2 VIEWS, XR PELVIS 1/2 VIEWS  LOCATION: [facility]  DATE: 2/11/2024    INDICATION: fall, distal femur pain  COMPARISON: Knee radiographs 11/06/2023 and left hip radiographs 03/15/2020      Impression    IMPRESSION: Acute comminuted mildly impacted intertrochanteric right femoral neck fracture with medial displacement of the lesser trochanter fracture  fragment.    Mild degenerative arthrosis both hips. Degenerative changes both SI joints. Lower lumbar facet arthropathy.    Small corticated curvilinear ossific fragment posterior to the proximal tibial metaphysis which may be artifactual related to positioning, or sequela of remote trauma. Mild narrowing of the medial compartment of the right knee. No right knee joint   effusion.     NOTE: ABNORMAL REPORT    THE DICTATION ABOVE DESCRIBES AN ABNORMALITY FOR WHICH FOLLOW-UP IS NEEDED.   XR Knee Right 1/2 Views    Narrative    EXAM: XR FEMUR RIGHT 2 VIEWS, XR KNEE RIGHT 1/2 VIEWS, XR PELVIS 1/2 VIEWS  LOCATION: [facility]  DATE: 2/11/2024    INDICATION: fall, distal femur pain  COMPARISON: Knee radiographs 11/06/2023 and left hip radiographs 03/15/2020      Impression    IMPRESSION: Acute comminuted mildly impacted intertrochanteric right femoral neck fracture with medial displacement of the lesser trochanter fracture fragment.    Mild degenerative arthrosis both hips. Degenerative changes both SI joints. Lower lumbar facet arthropathy.    Small corticated curvilinear ossific fragment posterior to the proximal tibial metaphysis which may be artifactual related to positioning, or sequela of remote trauma. Mild narrowing of the medial compartment of the right knee. No right knee joint   effusion.     NOTE: ABNORMAL REPORT    THE DICTATION ABOVE DESCRIBES AN ABNORMALITY FOR WHICH FOLLOW-UP IS NEEDED.   XR Pelvis 1/2 Views    Narrative    EXAM: XR FEMUR RIGHT 2 VIEWS, XR KNEE RIGHT 1/2 VIEWS, XR PELVIS 1/2 VIEWS  LOCATION: [facility]  DATE: 2/11/2024    INDICATION: fall, distal femur pain  COMPARISON: Knee radiographs 11/06/2023 and left hip radiographs 03/15/2020      Impression    IMPRESSION: Acute comminuted mildly impacted intertrochanteric right femoral neck fracture with medial displacement of the lesser trochanter fracture fragment.    Mild degenerative arthrosis  both hips. Degenerative changes both SI joints. Lower lumbar facet arthropathy.    Small corticated curvilinear ossific fragment posterior to the proximal tibial metaphysis which may be artifactual related to positioning, or sequela of remote trauma. Mild narrowing of the medial compartment of the right knee. No right knee joint   effusion.     NOTE: ABNORMAL REPORT    THE DICTATION ABOVE DESCRIBES AN ABNORMALITY FOR WHICH FOLLOW-UP IS NEEDED.   XR Chest Port 1 View    Narrative    EXAM: XR CHEST PORT 1 VIEW  LOCATION: [facility]  DATE: 2/11/2024    INDICATION: Preoperative planning. Trauma.  COMPARISON: Chest radiograph 01/29/2023.      Impression    IMPRESSION:    Lung volumes are low, with associated hypoventilatory changes. No airspace opacities, pleural effusions, or pneumothorax. Normal pulmonary vascularity. Nonenlarged cardiac silhouette. Aortic atherosclerotic calcifications.   CT Hip Right w/o Contrast    Narrative    EXAM: CT HIP RIGHT WITHOUT CONTRAST  LOCATION: [facility]  DATE: 02/11/2024    INDICATION: Follow-up fracture, surgical planning.  COMPARISON: None.  TECHNIQUE: Noncontrast. Axial, sagittal and coronal thin-section reconstruction. Dose reduction techniques were used.     FINDINGS:     BONES:  -Comminuted intertrochanteric fracture of the right hip. Slight impaction along the inferomedial margin of the fracture line. No evidence for dislocation. Mild degenerative change at the right hip joint itself. Slight distraction of the main fracture   fragment involving the lesser trochanter, identified on the axial source imaging series 2, image 69.    The visualized portion of the right hemipelvis is negative for fracture. There is near ankylosis of the right SI joint.    SOFT TISSUES:  -Normal.      Impression    IMPRESSION:  1.  Comminuted intertrochanteric fracture of the right hip with slight impaction along the inferomedial aspect of the  "fracture and slight distraction of the main fracture fragment involving the lesser trochanter.  2.  No evidence for dislocation of the right hip joint itself.  3.  Mild degenerative change at the right hip joint itself.  4.  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  5.  No additional fractures are identified.  6.  No significant effusion.  7.  No significant hematoma or fluid collection.       POC US Guidance Needle Placement    Narrative    Ultrasound was performed as guidance to an anesthesia procedure.  Click   \"PACS images\" hyperlink below to view any stored images.  For specific   procedure details, view procedure note authored by anesthesia.   XR Surgery AB  Fluoro G/T 5 Min    Narrative    This exam was marked as non-reportable because it will not be read by a   radiologist or a [place] non-radiologist provider.         CT Head w/o Contrast    Narrative    EXAM: CT HEAD W/O CONTRAST  LOCATION: [facility]  DATE: 2/11/2024    INDICATION: Mechanical fall, possible head trauma  COMPARISON: None.  TECHNIQUE: Routine CT Head without IV contrast. Multiplanar reformats. Dose reduction techniques were used.    FINDINGS:  INTRACRANIAL CONTENTS: No intracranial hemorrhage, extraaxial collection, or mass effect.  Negative for large vascular territory subacute infarct. 7 mm focal hypodensity at the left cerebellar hemisphere represents age-indeterminate lacunar infarct. Mild   presumed chronic small vessel ischemic changes. Mild generalized volume loss. No hydrocephalus. Intracranial atherosclerosis is present.     VISUALIZED ORBITS/SINUSES/MASTOIDS: No intraorbital abnormality. No paranasal sinus mucosal disease. No middle ear or mastoid effusion.    BONES/SOFT TISSUES: No scalp hematoma. No skull fracture.      Impression    IMPRESSION:  1.  No acute traumatic findings.  2.  No calvarial or skull base fracture.  3.  Age-indeterminate lacunar infarct in the left " cerebellar hemisphere. Consider further evaluation with MRI.  4.  Brain atrophy and presumed chronic microvascular ischemic changes as above.         Discharge Medications   Current Discharge Medication List        START taking these medications    Details   oxyCODONE (ROXICODONE) 5 MG tablet Take 1 tablet (5 mg) by mouth every 4 hours as needed for moderate pain  Qty: 26 tablet, Refills: 0    Associated Diagnoses: Closed fracture of right hip, initial encounter (H)      senna-docusate (SENOKOT-S/PERICOLACE) 8.6-50 MG tablet Take 1 tablet by mouth 2 times daily  Qty: 60 tablet, Refills: 0    Associated Diagnoses: Closed fracture of right hip, initial encounter (H)           CONTINUE these medications which have CHANGED    Details   acetaminophen (TYLENOL) 325 MG tablet Take 2 tablets (650 mg) by mouth every 4 hours as needed for other (For optimal non-opioid multimodal pain management to improve pain control.)  Qty: 100 tablet, Refills: 0    Associated Diagnoses: Closed fracture of right hip, initial encounter (H)      aspirin 81 MG EC tablet Take 1 tablet (81 mg) by mouth 2 times daily  Qty: 60 tablet, Refills: 0    Associated Diagnoses: Closed fracture of right hip, initial encounter (H)      Magnesium Oxide 250 MG TABS Take 1 tablet (250 mg) by mouth daily    Associated Diagnoses: Hypomagnesemia           CONTINUE these medications which have NOT CHANGED    Details   calcium carbonate (OS-FAROOQ) 500 MG tablet Take 1 tablet by mouth daily      multivitamin w/minerals (MULTI-VITAMIN) tablet Take 1 tablet by mouth daily      ranibizumab (LUCENTIS) 0.5 MG/0.05ML SOLN 0.5 mg by Intravitreal route every 28 days      rosuvastatin (CRESTOR) 40 MG tablet Take 40 mg by mouth daily      vitamin C (ASCORBIC ACID) 250 MG tablet Take 250 mg by mouth daily           Allergies   Allergies   Allergen Reactions    Phenylephrine-Doxylamine-Dm Palpitations

## 2024-02-12 NOTE — PROGRESS NOTES
Occupational Therapy Discharge Summary    Reason for therapy discharge:    All goals and outcomes met, no further needs identified.    Progress towards therapy goal(s). See goals on Care Plan in [facility] electronic health record for goal details.  Goals met    Therapy recommendation(s):    No further therapy is recommended.

## 2024-02-12 NOTE — PLAN OF CARE
Problem: Hip Fracture Surgical Repair  Goal: Optimal Coping with Change in Health Status  Outcome: Progressing  Goal: Absence of Bleeding  Outcome: Progressing  Goal: Effective Bowel Elimination  Outcome: Progressing  Goal: Cognitive Function Maintained  Outcome: Progressing  Goal: Fluid and Electrolyte Balance  Outcome: Progressing  Goal: Absence of Infection Signs and Symptoms  Outcome: Progressing  Goal: Optimal Functional Ability  Outcome: Progressing  Goal: Anesthesia/Sedation Recovery  Outcome: Progressing  Intervention: Optimize Anesthesia Recovery  Recent Flowsheet Documentation  Taken 2/12/2024 0837 by Nicolas Mcghee, RN  Safety Promotion/Fall Prevention:   activity supervised   assistive device/personal items within reach   clutter free environment maintained   increased rounding and observation   increase visualization of patient   mobility aid in reach   lighting adjusted   nonskid shoes/slippers when out of bed   patient and family education   room door open   room near nurse's station   room organization consistent   safety round/check completed   supervised activity  Goal: Optimal Pain Control and Function  Outcome: Progressing  Intervention: Prevent or Manage Pain  Recent Flowsheet Documentation  Taken 2/12/2024 1040 by Nicolas Mcghee, RN  Pain Management Interventions:   cold applied   rest   repositioned   medication (see MAR)   care clustered   pain management plan reviewed with patient/caregiver  Taken 2/12/2024 0825 by Nicolas Mcghee, RN  Pain Management Interventions:   cold applied   rest   repositioned   medication (see MAR)   care clustered   pain management plan reviewed with patient/caregiver  Goal: Nausea and Vomiting Relief  Outcome: Progressing  Goal: Effective Urinary Elimination  Outcome: Progressing  Goal: Effective Oxygenation and Ventilation  Outcome: Progressing         Patient vital signs are at baseline: Yes  Patient able to ambulate as they were prior to admission or with  assist devices provided by therapies during their stay:  Yes  Patient MUST void prior to discharge:  Yes  Patient able to tolerate oral intake:  Yes  Pain has adequate pain control using Oral analgesics:  Yes  Does patient have an identified :  Yes  Has goal D/C date and time been discussed with patient:  Yes

## 2024-02-12 NOTE — PROGRESS NOTES
"Orthopedic Progress Note      Assessment: 1 Day Post-Op  S/P Procedure(s):  INTERNAL FIXATION, FRACTURE, TROCHANTERIC, HIP, USING PINS OR RODS     Plan:   - Continue PT/OT  - Weightbearing status: WBAT  - Anticoagulation: ASA 81mg BID x 4 weeks in addition to SCDs, shelley stockings and early ambulation.  - Discharge planning: Planning for discharge to home with assistance pending therapy stair eval and medical clearance.      Subjective:  Pain: Mild-moderate - notes \"soreness\"  Chest pain, SOB: No  Nausea, Vomiting:  No  Lightheadedness, Dizziness:  No  Neuro:  Patient denies new onset numbness or paresthesias    Patient is resting comfortably at bedside surrounded by his family. He reports pain is well managed with oral pain medications. Ambulating. Voiding. Tolerating oral intake. Wife works from home and will be present at all times for assistance if needed. All questions were sought and answered. Hgb 12.9 (14.7 prior).    Objective:  /72 (BP Location: Left arm)   Pulse 73   Temp 98.1  F (36.7  C) (Oral)   Resp 16   Ht 1.803 m (5' 11\")   Wt 91.6 kg (202 lb)   SpO2 96%   BMI 28.17 kg/m    The patient is A&Ox3. Appears comfortable.   Sensation is intact.  Dorsiflexion and plantar flexion is intact.  Dorsalis pedis pulse intact.  Calves are soft and non-tender. Negative Sonia's.  The incision is covered. Dressing C/D/I.  No drain.    Pertinent Labs   Lab Results: personally reviewed.   Lab Results   Component Value Date    INR 1.04 02/11/2024     Lab Results   Component Value Date    WBC 15.1 (H) 02/12/2024    HGB 12.9 (L) 02/12/2024    HCT 38.9 (L) 02/12/2024    MCV 94 02/12/2024     (L) 02/12/2024     Lab Results   Component Value Date     02/11/2024    CO2 25 02/11/2024         Report completed by:  Concetta Kinney PA-C/[facility]    Date: 2/12/2024  Time: 9:54 AM    "

## 2024-02-12 NOTE — PROGRESS NOTES
Physical Therapy Discharge Summary    Reason for therapy discharge:    All goals and outcomes met, no further needs identified.    Progress towards therapy goal(s). See goals on Care Plan in [facility] electronic health record for goal details.  Goals met    Therapy recommendation(s):    Continued therapy is recommended.  Rationale/Recommendations:  Home PT & Home Exercise Program.

## 2024-02-13 ENCOUNTER — PATIENT OUTREACH (OUTPATIENT)
Dept: CARE COORDINATION | Facility: CLINIC | Age: 64
End: 2024-02-13
Payer: COMMERCIAL

## 2024-02-13 NOTE — PROGRESS NOTES
[facility]    Background: Transitional Care Management program identified per system criteria and reviewed by [facility] team for possible outreach.    Assessment: Upon chart review, [facility] Team member will not proceed with patient outreach related to this episode of Transitional Care Management program due to reason below:    Patient declined to answer all post hospital discharge questions with [facility] team member and disconnected call.    Plan: Transitional Care Management episode addressed appropriately per reason noted above.      MARY Allen  [facility]    *[facility] Resource Team does NOT follow patient ongoing. Referrals are identified based on internal discharge reports and the outreach is to ensure patient has an understanding of their discharge instructions.

## 2024-05-26 ENCOUNTER — HEALTH MAINTENANCE LETTER (OUTPATIENT)
Age: 64
End: 2024-05-26

## 2025-03-17 NOTE — ANESTHESIA PREPROCEDURE EVALUATION
Anesthesia Pre-Procedure Evaluation    Patient: To Hearn   MRN: [id number] : 1960        Procedure : Procedure(s):  INTERNAL FIXATION, FRACTURE, TROCHANTERIC, HIP, USING PINS OR RODS          Past Medical History:   Diagnosis Date    NO ACTIVE PROBLEMS       Past Surgical History:   Procedure Laterality Date    ARTHROSCOPY KNEE  11/10/2011    Procedure:ARTHROSCOPY KNEE; Right knee arthroscopy, meniscal debridement, choice anesthesia; Surgeon:MELVINA ALFORD; Location:[facility] OR    COLONOSCOPY  10/2011    COLONOSCOPY  10/3/2011    Procedure:COMBINED COLONOSCOPY, SINGLE BIOPSY/POLYPECTOMY BY BIOPSY; Colonoscopy, screening; Surgeon:JILLIAN FROST; Location:[facility] OR    HC KNEE SCOPE,MED/LAT MENISECTOMY  11/10/2011    Rt knee scope, partial MM    TONSILLECTOMY   or       Allergies   Allergen Reactions    Phenylephrine-Doxylamine-Dm Palpitations      Social History     Tobacco Use    Smoking status: Every Day     Packs/day: .5     Types: Cigarettes    Smokeless tobacco: Never   Substance Use Topics    Alcohol use: Yes     Comment: socially      Wt Readings from Last 1 Encounters:   24 91.6 kg (202 lb)        Anesthesia Evaluation   Pt has had prior anesthetic.     No history of anesthetic complications       ROS/MED HX  ENT/Pulmonary:     (+)                tobacco use,                        Neurologic:  - neg neurologic ROS     Cardiovascular:     (+)  - -  CAD (hx of stent. not on plavix per patient. ASA only.) -  - -                                      METS/Exercise Tolerance:     Hematologic:  - neg hematologic  ROS     Musculoskeletal:       GI/Hepatic:  - neg GI/hepatic ROS     Renal/Genitourinary:  - neg Renal ROS     Endo:  - neg endo ROS     Psychiatric/Substance Use:       Infectious Disease:       Malignancy:       Other:            Physical Exam    Airway        Mallampati: II   TM distance: > 3 FB   Neck ROM: full   Mouth opening: > 3 cm    Respiratory Devices and  Telephone Call RE:  Lab Reminder      Outcome:    INR due, requested patient check on meter if hh does not draw blood work tomorrow     [x] Patient verbalizes understanding    [] Unable to reach   [] Left message              []       Bety Aranda RN      "Support         Dental       (+) Removable bridges or other hardware      Cardiovascular          Rhythm and rate: regular and normal     Pulmonary           breath sounds clear to auscultation           OUTSIDE LABS:  CBC:   Lab Results   Component Value Date    WBC 12.8 (H) 02/11/2024    WBC 8.6 11/13/2012    HGB 14.7 02/11/2024    HGB 14.5 11/13/2012    HCT 43.2 02/11/2024    HCT 43.0 11/13/2012     02/11/2024     11/13/2012     BMP:   Lab Results   Component Value Date     02/11/2024     11/13/2012    POTASSIUM 4.8 02/11/2024    POTASSIUM 3.9 11/13/2012    CHLORIDE 104 02/11/2024    CHLORIDE 106 11/13/2012    CO2 25 02/11/2024    CO2 22 11/13/2012    BUN 16.6 02/11/2024    BUN 19 11/13/2012    CR 1.00 02/11/2024    CR 0.86 11/13/2012     (H) 02/11/2024     (H) 02/11/2024     COAGS:   Lab Results   Component Value Date    INR 1.04 02/11/2024     POC: No results found for: \"BGM\", \"HCG\", \"HCGS\"  HEPATIC:   Lab Results   Component Value Date    ALBUMIN 4.4 11/13/2012    PROTTOTAL 7.6 11/13/2012    ALT 33 11/13/2012    AST 32 11/13/2012    ALKPHOS 71 11/13/2012    BILITOTAL 0.5 11/13/2012     OTHER:   Lab Results   Component Value Date    FAROOQ 9.4 02/11/2024    TSH 2.04 11/13/2012       Anesthesia Plan    ASA Status:  3    NPO Status:  NPO Appropriate    Anesthesia Type: Spinal.              Consents    Anesthesia Plan(s) and associated risks, benefits, and realistic alternatives discussed. Questions answered and patient/representative(s) expressed understanding.     - Discussed: Risks, Benefits and Alternatives for the PROCEDURE were discussed     - Discussed with:  Patient, Spouse            Postoperative Care    Pain management: Peripheral nerve block (Single Shot), IV analgesics, Oral pain medications, Multi-modal analgesia.   PONV prophylaxis: Ondansetron (or other 5HT-3), Dexamethasone or Solumedrol     Comments:               Mirza Whitehead MD    I have reviewed " "the pertinent notes and labs in the chart from the past 30 days and (re)examined the patient.  Any updates or changes from those notes are reflected in this note.             # Drug Induced Platelet Defect: home medication list includes an antiplatelet medication  # Overweight: Estimated body mass index is 28.17 kg/m  as calculated from the following:    Height as of this encounter: 1.803 m (5' 11\").    Weight as of this encounter: 91.6 kg (202 lb).      "

## 2025-06-14 ENCOUNTER — HEALTH MAINTENANCE LETTER (OUTPATIENT)
Age: 65
End: 2025-06-14

## (undated) DEVICE — GLOVE BIOGEL PI ULTRATOUCH G SZ 7.0 42170

## (undated) DEVICE — WIRE GUIDE 3.2X400MM  357.399

## (undated) DEVICE — KIT PATIENT CARE HANA TABLE PROFX SUPINE 6855

## (undated) DEVICE — GOWN IMPERVIOUS BREATHABLE SMART LG 89015

## (undated) DEVICE — SU ETHILON 3-0 PS-1 18" 1663G

## (undated) DEVICE — Device

## (undated) DEVICE — GLOVE BIOGEL PI INDICATOR 8.0 LF 41680

## (undated) DEVICE — GLOVE BIOGEL PI ORTHOPRO SZ 7.5 47675

## (undated) DEVICE — GLOVE UNDER INDICATOR PI SZ 7.0 LF 41670

## (undated) DEVICE — ELECTRODE PATIENT RETURN ADULT L10 FT 2 PLATE CORD 0855C

## (undated) DEVICE — ROD SYN REAMER BALL TIP 2.5X950MM  351.706S

## (undated) DEVICE — DRAPE IOBAN ISOLATION VERTICAL 320X21CM 6617

## (undated) DEVICE — PREP CHLORAPREP 26ML TINTED HI-LITE ORANGE 930815

## (undated) DEVICE — DRSG MEPILEX BORDER FLEX 3X3" 595200

## (undated) DEVICE — CATH TRAY FOLEY SURESTEP 16FR DRAIN BAG STATOCK A899916

## (undated) DEVICE — MAT FLOOR SURGICAL 40X38 0702140238

## (undated) DEVICE — SUTURE VICRYL+ 2-0 27IN CT-1 UND VCP259H

## (undated) DEVICE — DRILL BIT CANNULATED 16MM HOLLOW STER 03.037.004S

## (undated) DEVICE — SUCTION TIP FLEXI CLEAR TIP DISP K62

## (undated) DEVICE — SOL NACL 0.9% IRRIG 1000ML BOTTLE 2F7124

## (undated) DEVICE — SUTURE VICRYL+ 0 27IN CT-1 UND VCP260H

## (undated) DEVICE — PADDING CAST 4IN WEBRIL STRL 2502

## (undated) DEVICE — SOL WATER IRRIG 1000ML BOTTLE 2F7114

## (undated) DEVICE — SOL ISOPROPYL RUBBING ALCOHOL USP 70% 4OZ HDX-20 I0020

## (undated) DEVICE — STPL SKIN 35W 6.9MM  PXW35

## (undated) DEVICE — DRAPE C-ARM 60X42" 1013

## (undated) DEVICE — BLADE KNIFE SURG 15 371115

## (undated) DEVICE — DRAPE C-ARMOR 5 SIDED 5523

## (undated) DEVICE — DRAPE IOBAN INCISE 23X17" 6650EZ

## (undated) DEVICE — ROD RM 950MM 3MM 3.8MM BALL TIP STRL

## (undated) DEVICE — CUSTOM PACK GEN MAJOR SBA5BGMHEA

## (undated) DEVICE — DRILL BIT QUICK COUPLING 3 FLUTE 4.2MMX330/100MM CALIBRATE

## (undated) RX ORDER — ONDANSETRON 2 MG/ML
INJECTION INTRAMUSCULAR; INTRAVENOUS
Status: DISPENSED
Start: 2024-02-11

## (undated) RX ORDER — DEXAMETHASONE SODIUM PHOSPHATE 4 MG/ML
INJECTION, SOLUTION INTRA-ARTICULAR; INTRALESIONAL; INTRAMUSCULAR; INTRAVENOUS; SOFT TISSUE
Status: DISPENSED
Start: 2024-02-11

## (undated) RX ORDER — TRANEXAMIC ACID 100 MG/ML
INJECTION, SOLUTION INTRAVENOUS
Status: DISPENSED
Start: 2024-02-11

## (undated) RX ORDER — PROPOFOL 10 MG/ML
INJECTION, EMULSION INTRAVENOUS
Status: DISPENSED
Start: 2024-02-11

## (undated) RX ORDER — EPHEDRINE SULFATE 50 MG/ML
INJECTION, SOLUTION INTRAMUSCULAR; INTRAVENOUS; SUBCUTANEOUS
Status: DISPENSED
Start: 2024-02-11

## (undated) RX ORDER — FENTANYL CITRATE 50 UG/ML
INJECTION, SOLUTION INTRAMUSCULAR; INTRAVENOUS
Status: DISPENSED
Start: 2024-02-11

## (undated) RX ORDER — BUPIVACAINE HYDROCHLORIDE 5 MG/ML
INJECTION, SOLUTION EPIDURAL; INTRACAUDAL
Status: DISPENSED
Start: 2024-02-11

## (undated) RX ORDER — LIDOCAINE HYDROCHLORIDE 10 MG/ML
INJECTION, SOLUTION EPIDURAL; INFILTRATION; INTRACAUDAL; PERINEURAL
Status: DISPENSED
Start: 2024-02-11